# Patient Record
Sex: FEMALE | Race: WHITE | Employment: OTHER | URBAN - METROPOLITAN AREA
[De-identification: names, ages, dates, MRNs, and addresses within clinical notes are randomized per-mention and may not be internally consistent; named-entity substitution may affect disease eponyms.]

---

## 2019-02-07 DIAGNOSIS — M25.551 RIGHT HIP PAIN: Primary | ICD-10-CM

## 2019-02-19 ENCOUNTER — HOSPITAL ENCOUNTER (OUTPATIENT)
Dept: INTERVENTIONAL RADIOLOGY/VASCULAR | Age: 49
Discharge: HOME | End: 2019-02-19
Payer: COMMERCIAL

## 2019-02-19 DIAGNOSIS — M25.551 RIGHT HIP PAIN: ICD-10-CM

## 2019-02-19 PROCEDURE — 76000 FLUOROSCOPY <1 HR PHYS/QHP: CPT

## 2019-03-26 ENCOUNTER — TELEPHONE (OUTPATIENT)
Dept: ORTHOPEDIC SURGERY | Age: 49
End: 2019-03-26

## 2019-03-26 NOTE — TELEPHONE ENCOUNTER
Yana RECORDS 2008 TO PRESENT [facility] ) AND A NO RECORDS FOR DATE REQUESTED STATEMENT FOR 2008 TO THE PRESENT FOR BILLING TO PEG RAPP TO SCAN IN MRO TO TEAM LEGAL

## 2024-10-18 RX ORDER — TORSEMIDE 20 MG/1
20 TABLET ORAL DAILY
COMMUNITY
Start: 2024-08-21

## 2024-10-18 RX ORDER — SENNA AND DOCUSATE SODIUM 50; 8.6 MG/1; MG/1
1 TABLET, FILM COATED ORAL DAILY
COMMUNITY

## 2024-10-18 RX ORDER — POTASSIUM CHLORIDE 750 MG/1
10 CAPSULE, EXTENDED RELEASE ORAL DAILY
COMMUNITY

## 2024-10-22 ENCOUNTER — ANESTHESIA EVENT (OUTPATIENT)
Dept: OPERATING ROOM | Age: 54
End: 2024-10-22
Payer: MEDICARE

## 2024-10-23 ENCOUNTER — APPOINTMENT (OUTPATIENT)
Dept: CT IMAGING | Age: 54
DRG: 427 | End: 2024-10-23
Attending: NEUROLOGICAL SURGERY
Payer: MEDICARE

## 2024-10-23 ENCOUNTER — ANESTHESIA (OUTPATIENT)
Dept: OPERATING ROOM | Age: 54
End: 2024-10-23
Payer: MEDICARE

## 2024-10-23 ENCOUNTER — HOSPITAL ENCOUNTER (INPATIENT)
Age: 54
LOS: 4 days | Discharge: HOME | DRG: 427 | End: 2024-10-27
Attending: NEUROLOGICAL SURGERY | Admitting: NEUROLOGICAL SURGERY
Payer: MEDICARE

## 2024-10-23 ENCOUNTER — APPOINTMENT (OUTPATIENT)
Dept: GENERAL RADIOLOGY | Age: 54
DRG: 427 | End: 2024-10-23
Attending: NEUROLOGICAL SURGERY
Payer: MEDICARE

## 2024-10-23 DIAGNOSIS — Z98.1 S/P LUMBAR FUSION: Primary | ICD-10-CM

## 2024-10-23 PROBLEM — M54.16 LUMBAR RADICULOPATHY: Status: ACTIVE | Noted: 2024-10-23

## 2024-10-23 LAB
ABO + RH BLD: NORMAL
APTT BLD: 29.5 SEC (ref 22.1–36.4)
APTT BLD: 31.5 SEC (ref 22.1–36.4)
BASOPHILS # BLD: 0 K/UL (ref 0–0.2)
BASOPHILS NFR BLD: 0.3 %
BLD GP AB SCN SERPL QL: NORMAL
DEPRECATED RDW RBC AUTO: 16.7 % (ref 12.4–15.4)
EOSINOPHIL # BLD: 0 K/UL (ref 0–0.6)
EOSINOPHIL NFR BLD: 0 %
HCT VFR BLD AUTO: 31.7 % (ref 36–48)
HGB BLD-MCNC: 10.7 G/DL (ref 12–16)
INR PPP: 0.94 (ref 0.85–1.15)
INR PPP: 1.07 (ref 0.85–1.15)
LYMPHOCYTES # BLD: 0.6 K/UL (ref 1–5.1)
LYMPHOCYTES NFR BLD: 8.2 %
MCH RBC QN AUTO: 26.9 PG (ref 26–34)
MCHC RBC AUTO-ENTMCNC: 33.6 G/DL (ref 31–36)
MCV RBC AUTO: 80 FL (ref 80–100)
MONOCYTES # BLD: 0.2 K/UL (ref 0–1.3)
MONOCYTES NFR BLD: 3 %
NEUTROPHILS # BLD: 6.9 K/UL (ref 1.7–7.7)
NEUTROPHILS NFR BLD: 88.5 %
PLATELET # BLD AUTO: 158 K/UL (ref 135–450)
PMV BLD AUTO: 8.9 FL (ref 5–10.5)
PROTHROMBIN TIME: 12.8 SEC (ref 11.9–14.9)
PROTHROMBIN TIME: 14.1 SEC (ref 11.9–14.9)
RBC # BLD AUTO: 3.96 M/UL (ref 4–5.2)
WBC # BLD AUTO: 7.8 K/UL (ref 4–11)

## 2024-10-23 PROCEDURE — 87641 MR-STAPH DNA AMP PROBE: CPT

## 2024-10-23 PROCEDURE — 2709999900 HC NON-CHARGEABLE SUPPLY: Performed by: NEUROLOGICAL SURGERY

## 2024-10-23 PROCEDURE — 85025 COMPLETE CBC W/AUTO DIFF WBC: CPT

## 2024-10-23 PROCEDURE — 72131 CT LUMBAR SPINE W/O DYE: CPT

## 2024-10-23 PROCEDURE — 2720000010 HC SURG SUPPLY STERILE: Performed by: NEUROLOGICAL SURGERY

## 2024-10-23 PROCEDURE — 6360000002 HC RX W HCPCS

## 2024-10-23 PROCEDURE — 2580000003 HC RX 258: Performed by: PHYSICIAN ASSISTANT

## 2024-10-23 PROCEDURE — C1713 ANCHOR/SCREW BN/BN,TIS/BN: HCPCS | Performed by: NEUROLOGICAL SURGERY

## 2024-10-23 PROCEDURE — 2500000003 HC RX 250 WO HCPCS

## 2024-10-23 PROCEDURE — 86850 RBC ANTIBODY SCREEN: CPT

## 2024-10-23 PROCEDURE — 6370000000 HC RX 637 (ALT 250 FOR IP)

## 2024-10-23 PROCEDURE — 3700000000 HC ANESTHESIA ATTENDED CARE: Performed by: NEUROLOGICAL SURGERY

## 2024-10-23 PROCEDURE — 3600000004 HC SURGERY LEVEL 4 BASE: Performed by: NEUROLOGICAL SURGERY

## 2024-10-23 PROCEDURE — 6360000002 HC RX W HCPCS: Performed by: NEUROLOGICAL SURGERY

## 2024-10-23 PROCEDURE — 0SG1071 FUSION OF 2 OR MORE LUMBAR VERTEBRAL JOINTS WITH AUTOLOGOUS TISSUE SUBSTITUTE, POSTERIOR APPROACH, POSTERIOR COLUMN, OPEN APPROACH: ICD-10-PCS | Performed by: NEUROLOGICAL SURGERY

## 2024-10-23 PROCEDURE — 86901 BLOOD TYPING SEROLOGIC RH(D): CPT

## 2024-10-23 PROCEDURE — 6360000002 HC RX W HCPCS: Performed by: NURSE ANESTHETIST, CERTIFIED REGISTERED

## 2024-10-23 PROCEDURE — 2580000003 HC RX 258: Performed by: NEUROLOGICAL SURGERY

## 2024-10-23 PROCEDURE — 3600000014 HC SURGERY LEVEL 4 ADDTL 15MIN: Performed by: NEUROLOGICAL SURGERY

## 2024-10-23 PROCEDURE — 0SP004Z REMOVAL OF INTERNAL FIXATION DEVICE FROM LUMBAR VERTEBRAL JOINT, OPEN APPROACH: ICD-10-PCS | Performed by: NEUROLOGICAL SURGERY

## 2024-10-23 PROCEDURE — 2060000000 HC ICU INTERMEDIATE R&B

## 2024-10-23 PROCEDURE — 0SG00AJ FUSION OF LUMBAR VERTEBRAL JOINT WITH INTERBODY FUSION DEVICE, POSTERIOR APPROACH, ANTERIOR COLUMN, OPEN APPROACH: ICD-10-PCS | Performed by: NEUROLOGICAL SURGERY

## 2024-10-23 PROCEDURE — 3700000001 HC ADD 15 MINUTES (ANESTHESIA): Performed by: NEUROLOGICAL SURGERY

## 2024-10-23 PROCEDURE — 6370000000 HC RX 637 (ALT 250 FOR IP): Performed by: PHYSICIAN ASSISTANT

## 2024-10-23 PROCEDURE — 6360000002 HC RX W HCPCS: Performed by: PHYSICIAN ASSISTANT

## 2024-10-23 PROCEDURE — 72100 X-RAY EXAM L-S SPINE 2/3 VWS: CPT

## 2024-10-23 PROCEDURE — C9290 INJ, BUPIVACAINE LIPOSOME: HCPCS | Performed by: NEUROLOGICAL SURGERY

## 2024-10-23 PROCEDURE — 86900 BLOOD TYPING SEROLOGIC ABO: CPT

## 2024-10-23 PROCEDURE — 01NB0ZZ RELEASE LUMBAR NERVE, OPEN APPROACH: ICD-10-PCS | Performed by: NEUROLOGICAL SURGERY

## 2024-10-23 PROCEDURE — 0ST20ZZ RESECTION OF LUMBAR VERTEBRAL DISC, OPEN APPROACH: ICD-10-PCS | Performed by: NEUROLOGICAL SURGERY

## 2024-10-23 PROCEDURE — 7100000001 HC PACU RECOVERY - ADDTL 15 MIN: Performed by: NEUROLOGICAL SURGERY

## 2024-10-23 PROCEDURE — 85730 THROMBOPLASTIN TIME PARTIAL: CPT

## 2024-10-23 PROCEDURE — 85610 PROTHROMBIN TIME: CPT

## 2024-10-23 PROCEDURE — 2780000010 HC IMPLANT OTHER: Performed by: NEUROLOGICAL SURGERY

## 2024-10-23 PROCEDURE — 7100000000 HC PACU RECOVERY - FIRST 15 MIN: Performed by: NEUROLOGICAL SURGERY

## 2024-10-23 PROCEDURE — 6360000002 HC RX W HCPCS: Performed by: ANESTHESIOLOGY

## 2024-10-23 PROCEDURE — 2580000003 HC RX 258

## 2024-10-23 PROCEDURE — 2580000003 HC RX 258: Performed by: ANESTHESIOLOGY

## 2024-10-23 PROCEDURE — 2500000003 HC RX 250 WO HCPCS: Performed by: NEUROLOGICAL SURGERY

## 2024-10-23 DEVICE — DBM 7509211 MAGNIFUSE 1 X 10CM
Type: IMPLANTABLE DEVICE | Site: BACK | Status: FUNCTIONAL
Brand: MAGNIFUSE® BONE GRAFT

## 2024-10-23 DEVICE — SET SCREW SPNL 5.5-6 MM T27 NS TRIALTIS: Type: IMPLANTABLE DEVICE | Site: SPINE LUMBAR | Status: FUNCTIONAL

## 2024-10-23 DEVICE — GRAFT HUM TISS 10ML FRMBL CELLULAR BNE MTRX CRYOPRESERVED: Type: IMPLANTABLE DEVICE | Site: BACK | Status: FUNCTIONAL

## 2024-10-23 DEVICE — IMPLANTABLE DEVICE: Type: IMPLANTABLE DEVICE | Site: SPINE LUMBAR | Status: FUNCTIONAL

## 2024-10-23 DEVICE — ROD SPNL L70MM DIA55MM TI PRELORDOSED EXPEDIUM: Type: IMPLANTABLE DEVICE | Site: SPINE LUMBAR | Status: FUNCTIONAL

## 2024-10-23 RX ORDER — ENOXAPARIN SODIUM 100 MG/ML
40 INJECTION SUBCUTANEOUS DAILY
Status: DISCONTINUED | OUTPATIENT
Start: 2024-10-24 | End: 2024-10-27 | Stop reason: HOSPADM

## 2024-10-23 RX ORDER — HYDROCODONE BITARTRATE AND ACETAMINOPHEN 7.5; 325 MG/1; MG/1
1 TABLET ORAL EVERY 6 HOURS PRN
Status: DISCONTINUED | OUTPATIENT
Start: 2024-10-23 | End: 2024-10-23

## 2024-10-23 RX ORDER — METHOCARBAMOL 750 MG/1
750 TABLET, FILM COATED ORAL EVERY 8 HOURS
Status: DISPENSED | OUTPATIENT
Start: 2024-10-23 | End: 2024-10-24

## 2024-10-23 RX ORDER — SODIUM CHLORIDE 0.9 % (FLUSH) 0.9 %
5-40 SYRINGE (ML) INJECTION EVERY 12 HOURS SCHEDULED
Status: DISCONTINUED | OUTPATIENT
Start: 2024-10-23 | End: 2024-10-23 | Stop reason: HOSPADM

## 2024-10-23 RX ORDER — 0.9 % SODIUM CHLORIDE 0.9 %
INTRAVENOUS SOLUTION INTRAVENOUS CONTINUOUS
Status: DISCONTINUED | OUTPATIENT
Start: 2024-10-23 | End: 2024-10-23

## 2024-10-23 RX ORDER — LIDOCAINE HYDROCHLORIDE 20 MG/ML
INJECTION, SOLUTION INTRAVENOUS
Status: DISCONTINUED | OUTPATIENT
Start: 2024-10-23 | End: 2024-10-23 | Stop reason: SDUPTHER

## 2024-10-23 RX ORDER — BISACODYL 10 MG
10 SUPPOSITORY, RECTAL RECTAL DAILY PRN
Status: DISCONTINUED | OUTPATIENT
Start: 2024-10-23 | End: 2024-10-27 | Stop reason: HOSPADM

## 2024-10-23 RX ORDER — SODIUM CHLORIDE 9 MG/ML
INJECTION, SOLUTION INTRAVENOUS CONTINUOUS
Status: DISCONTINUED | OUTPATIENT
Start: 2024-10-23 | End: 2024-10-24

## 2024-10-23 RX ORDER — FENTANYL CITRATE 50 UG/ML
25 INJECTION, SOLUTION INTRAMUSCULAR; INTRAVENOUS EVERY 5 MIN PRN
Status: DISCONTINUED | OUTPATIENT
Start: 2024-10-23 | End: 2024-10-23 | Stop reason: HOSPADM

## 2024-10-23 RX ORDER — SODIUM CHLORIDE 9 MG/ML
INJECTION, SOLUTION INTRAVENOUS PRN
Status: DISCONTINUED | OUTPATIENT
Start: 2024-10-23 | End: 2024-10-27 | Stop reason: HOSPADM

## 2024-10-23 RX ORDER — SODIUM CHLORIDE, SODIUM LACTATE, POTASSIUM CHLORIDE, CALCIUM CHLORIDE 600; 310; 30; 20 MG/100ML; MG/100ML; MG/100ML; MG/100ML
INJECTION, SOLUTION INTRAVENOUS
Status: DISCONTINUED | OUTPATIENT
Start: 2024-10-23 | End: 2024-10-23 | Stop reason: SDUPTHER

## 2024-10-23 RX ORDER — FENTANYL CITRATE 50 UG/ML
INJECTION, SOLUTION INTRAMUSCULAR; INTRAVENOUS
Status: DISCONTINUED | OUTPATIENT
Start: 2024-10-23 | End: 2024-10-23 | Stop reason: SDUPTHER

## 2024-10-23 RX ORDER — HYDROMORPHONE HYDROCHLORIDE 2 MG/ML
INJECTION, SOLUTION INTRAMUSCULAR; INTRAVENOUS; SUBCUTANEOUS
Status: DISCONTINUED | OUTPATIENT
Start: 2024-10-23 | End: 2024-10-23 | Stop reason: SDUPTHER

## 2024-10-23 RX ORDER — PROCHLORPERAZINE EDISYLATE 5 MG/ML
5 INJECTION INTRAMUSCULAR; INTRAVENOUS
Status: DISCONTINUED | OUTPATIENT
Start: 2024-10-23 | End: 2024-10-23 | Stop reason: HOSPADM

## 2024-10-23 RX ORDER — LIDOCAINE HYDROCHLORIDE AND EPINEPHRINE 10; 10 MG/ML; UG/ML
INJECTION, SOLUTION INFILTRATION; PERINEURAL PRN
Status: DISCONTINUED | OUTPATIENT
Start: 2024-10-23 | End: 2024-10-23 | Stop reason: HOSPADM

## 2024-10-23 RX ORDER — ACETAMINOPHEN 325 MG/1
650 TABLET ORAL EVERY 6 HOURS
Status: DISCONTINUED | OUTPATIENT
Start: 2024-10-23 | End: 2024-10-27 | Stop reason: HOSPADM

## 2024-10-23 RX ORDER — SODIUM CHLORIDE 0.9 % (FLUSH) 0.9 %
5-40 SYRINGE (ML) INJECTION PRN
Status: DISCONTINUED | OUTPATIENT
Start: 2024-10-23 | End: 2024-10-27 | Stop reason: HOSPADM

## 2024-10-23 RX ORDER — HALOPERIDOL 5 MG/ML
1 INJECTION INTRAMUSCULAR
Status: DISCONTINUED | OUTPATIENT
Start: 2024-10-23 | End: 2024-10-23 | Stop reason: HOSPADM

## 2024-10-23 RX ORDER — DEXAMETHASONE SODIUM PHOSPHATE 4 MG/ML
INJECTION, SOLUTION INTRA-ARTICULAR; INTRALESIONAL; INTRAMUSCULAR; INTRAVENOUS; SOFT TISSUE
Status: DISCONTINUED | OUTPATIENT
Start: 2024-10-23 | End: 2024-10-23 | Stop reason: SDUPTHER

## 2024-10-23 RX ORDER — POLYETHYLENE GLYCOL 3350 17 G/17G
17 POWDER, FOR SOLUTION ORAL DAILY
Status: DISCONTINUED | OUTPATIENT
Start: 2024-10-24 | End: 2024-10-27 | Stop reason: HOSPADM

## 2024-10-23 RX ORDER — TORSEMIDE 20 MG/1
20 TABLET ORAL DAILY
Status: DISCONTINUED | OUTPATIENT
Start: 2024-10-24 | End: 2024-10-27 | Stop reason: HOSPADM

## 2024-10-23 RX ORDER — METHOCARBAMOL 100 MG/ML
1000 INJECTION, SOLUTION INTRAMUSCULAR; INTRAVENOUS EVERY 8 HOURS
Status: ACTIVE | OUTPATIENT
Start: 2024-10-23 | End: 2024-10-24

## 2024-10-23 RX ORDER — SODIUM CHLORIDE 9 MG/ML
INJECTION, SOLUTION INTRAVENOUS CONTINUOUS
Status: DISCONTINUED | OUTPATIENT
Start: 2024-10-23 | End: 2024-10-23 | Stop reason: HOSPADM

## 2024-10-23 RX ORDER — SUCCINYLCHOLINE/SOD CL,ISO/PF 200MG/10ML
SYRINGE (ML) INTRAVENOUS
Status: DISCONTINUED | OUTPATIENT
Start: 2024-10-23 | End: 2024-10-23 | Stop reason: SDUPTHER

## 2024-10-23 RX ORDER — SODIUM CHLORIDE 0.9 % (FLUSH) 0.9 %
5-40 SYRINGE (ML) INJECTION PRN
Status: DISCONTINUED | OUTPATIENT
Start: 2024-10-23 | End: 2024-10-23 | Stop reason: HOSPADM

## 2024-10-23 RX ORDER — NALOXONE HYDROCHLORIDE 0.4 MG/ML
INJECTION, SOLUTION INTRAMUSCULAR; INTRAVENOUS; SUBCUTANEOUS PRN
Status: DISCONTINUED | OUTPATIENT
Start: 2024-10-23 | End: 2024-10-23 | Stop reason: HOSPADM

## 2024-10-23 RX ORDER — OXYCODONE HYDROCHLORIDE 5 MG/1
5 TABLET ORAL EVERY 4 HOURS PRN
Status: DISCONTINUED | OUTPATIENT
Start: 2024-10-23 | End: 2024-10-27 | Stop reason: HOSPADM

## 2024-10-23 RX ORDER — FAMOTIDINE 20 MG/1
20 TABLET, FILM COATED ORAL 2 TIMES DAILY
Status: DISCONTINUED | OUTPATIENT
Start: 2024-10-23 | End: 2024-10-27 | Stop reason: HOSPADM

## 2024-10-23 RX ORDER — HYDROMORPHONE HYDROCHLORIDE 1 MG/ML
0.5 INJECTION, SOLUTION INTRAMUSCULAR; INTRAVENOUS; SUBCUTANEOUS EVERY 5 MIN PRN
Status: COMPLETED | OUTPATIENT
Start: 2024-10-23 | End: 2024-10-23

## 2024-10-23 RX ORDER — DIPHENHYDRAMINE HYDROCHLORIDE 50 MG/ML
25 INJECTION INTRAMUSCULAR; INTRAVENOUS EVERY 6 HOURS PRN
Status: DISCONTINUED | OUTPATIENT
Start: 2024-10-23 | End: 2024-10-27 | Stop reason: HOSPADM

## 2024-10-23 RX ORDER — PROPOFOL 10 MG/ML
INJECTION, EMULSION INTRAVENOUS
Status: DISCONTINUED | OUTPATIENT
Start: 2024-10-23 | End: 2024-10-23 | Stop reason: SDUPTHER

## 2024-10-23 RX ORDER — GLYCOPYRROLATE 0.2 MG/ML
INJECTION INTRAMUSCULAR; INTRAVENOUS
Status: DISCONTINUED | OUTPATIENT
Start: 2024-10-23 | End: 2024-10-23 | Stop reason: SDUPTHER

## 2024-10-23 RX ORDER — ONDANSETRON 2 MG/ML
4 INJECTION INTRAMUSCULAR; INTRAVENOUS EVERY 6 HOURS PRN
Status: DISCONTINUED | OUTPATIENT
Start: 2024-10-23 | End: 2024-10-27 | Stop reason: HOSPADM

## 2024-10-23 RX ORDER — HYDRALAZINE HYDROCHLORIDE 20 MG/ML
10 INJECTION INTRAMUSCULAR; INTRAVENOUS
Status: DISCONTINUED | OUTPATIENT
Start: 2024-10-23 | End: 2024-10-23 | Stop reason: HOSPADM

## 2024-10-23 RX ORDER — METHOCARBAMOL 100 MG/ML
INJECTION, SOLUTION INTRAMUSCULAR; INTRAVENOUS
Status: DISCONTINUED | OUTPATIENT
Start: 2024-10-23 | End: 2024-10-23 | Stop reason: SDUPTHER

## 2024-10-23 RX ORDER — ROCURONIUM BROMIDE 10 MG/ML
INJECTION, SOLUTION INTRAVENOUS
Status: DISCONTINUED | OUTPATIENT
Start: 2024-10-23 | End: 2024-10-23 | Stop reason: SDUPTHER

## 2024-10-23 RX ORDER — BISACODYL 5 MG/1
5 TABLET, DELAYED RELEASE ORAL DAILY PRN
Status: DISCONTINUED | OUTPATIENT
Start: 2024-10-23 | End: 2024-10-27 | Stop reason: HOSPADM

## 2024-10-23 RX ORDER — ONDANSETRON 2 MG/ML
INJECTION INTRAMUSCULAR; INTRAVENOUS
Status: DISCONTINUED | OUTPATIENT
Start: 2024-10-23 | End: 2024-10-23 | Stop reason: SDUPTHER

## 2024-10-23 RX ORDER — PROMETHAZINE HYDROCHLORIDE 12.5 MG/1
12.5 TABLET ORAL EVERY 6 HOURS PRN
Status: DISCONTINUED | OUTPATIENT
Start: 2024-10-23 | End: 2024-10-27 | Stop reason: HOSPADM

## 2024-10-23 RX ORDER — SODIUM CHLORIDE 9 MG/ML
INJECTION, SOLUTION INTRAVENOUS PRN
Status: DISCONTINUED | OUTPATIENT
Start: 2024-10-23 | End: 2024-10-23 | Stop reason: HOSPADM

## 2024-10-23 RX ORDER — HYDROMORPHONE HYDROCHLORIDE 1 MG/ML
0.5 INJECTION, SOLUTION INTRAMUSCULAR; INTRAVENOUS; SUBCUTANEOUS EVERY 4 HOURS PRN
Status: DISPENSED | OUTPATIENT
Start: 2024-10-23 | End: 2024-10-26

## 2024-10-23 RX ORDER — OXYCODONE HYDROCHLORIDE 5 MG/1
10 TABLET ORAL EVERY 4 HOURS PRN
Status: DISCONTINUED | OUTPATIENT
Start: 2024-10-23 | End: 2024-10-27 | Stop reason: HOSPADM

## 2024-10-23 RX ORDER — SODIUM CHLORIDE 0.9 % (FLUSH) 0.9 %
5-40 SYRINGE (ML) INJECTION EVERY 12 HOURS SCHEDULED
Status: DISCONTINUED | OUTPATIENT
Start: 2024-10-23 | End: 2024-10-27 | Stop reason: HOSPADM

## 2024-10-23 RX ORDER — METHOCARBAMOL 750 MG/1
750 TABLET, FILM COATED ORAL EVERY 8 HOURS
Status: DISCONTINUED | OUTPATIENT
Start: 2024-10-23 | End: 2024-10-23 | Stop reason: SDUPTHER

## 2024-10-23 RX ORDER — DIPHENHYDRAMINE HCL 25 MG
25 TABLET ORAL EVERY 6 HOURS PRN
Status: DISCONTINUED | OUTPATIENT
Start: 2024-10-23 | End: 2024-10-27 | Stop reason: HOSPADM

## 2024-10-23 RX ADMIN — ONDANSETRON 4 MG: 2 INJECTION INTRAMUSCULAR; INTRAVENOUS at 14:44

## 2024-10-23 RX ADMIN — FENTANYL CITRATE 50 MCG: 50 INJECTION, SOLUTION INTRAMUSCULAR; INTRAVENOUS at 17:35

## 2024-10-23 RX ADMIN — OXYCODONE 10 MG: 5 TABLET ORAL at 21:56

## 2024-10-23 RX ADMIN — PROPOFOL 150 MG: 10 INJECTION, EMULSION INTRAVENOUS at 14:37

## 2024-10-23 RX ADMIN — HYDROMORPHONE HYDROCHLORIDE 0.5 MG: 1 INJECTION, SOLUTION INTRAMUSCULAR; INTRAVENOUS; SUBCUTANEOUS at 18:25

## 2024-10-23 RX ADMIN — METHOCARBAMOL TABLETS 750 MG: 750 TABLET, COATED ORAL at 21:56

## 2024-10-23 RX ADMIN — HYDROMORPHONE HYDROCHLORIDE 0.5 MG: 1 INJECTION, SOLUTION INTRAMUSCULAR; INTRAVENOUS; SUBCUTANEOUS at 18:33

## 2024-10-23 RX ADMIN — CEFAZOLIN 2000 MG: 2 INJECTION, POWDER, FOR SOLUTION INTRAVENOUS at 22:39

## 2024-10-23 RX ADMIN — PROPOFOL 150 MCG/KG/MIN: 10 INJECTION, EMULSION INTRAVENOUS at 14:36

## 2024-10-23 RX ADMIN — SODIUM CHLORIDE, SODIUM LACTATE, POTASSIUM CHLORIDE, AND CALCIUM CHLORIDE: .6; .31; .03; .02 INJECTION, SOLUTION INTRAVENOUS at 14:43

## 2024-10-23 RX ADMIN — SODIUM CHLORIDE 900 ML: 9 INJECTION, SOLUTION INTRAVENOUS at 19:31

## 2024-10-23 RX ADMIN — ACETAMINOPHEN 650 MG: 325 TABLET ORAL at 21:56

## 2024-10-23 RX ADMIN — METHOCARBAMOL 250 MG: 100 INJECTION INTRAMUSCULAR; INTRAVENOUS at 15:18

## 2024-10-23 RX ADMIN — GLYCOPYRROLATE 0.2 MG: 0.2 INJECTION INTRAMUSCULAR; INTRAVENOUS at 16:15

## 2024-10-23 RX ADMIN — REMIFENTANIL HYDROCHLORIDE 0.15 MCG/KG/MIN: 1 INJECTION, POWDER, LYOPHILIZED, FOR SOLUTION INTRAVENOUS at 14:37

## 2024-10-23 RX ADMIN — WATER 2000 MG: 1 INJECTION INTRAMUSCULAR; INTRAVENOUS; SUBCUTANEOUS at 14:50

## 2024-10-23 RX ADMIN — METHOCARBAMOL 250 MG: 100 INJECTION INTRAMUSCULAR; INTRAVENOUS at 15:37

## 2024-10-23 RX ADMIN — ROCURONIUM BROMIDE 50 MG: 10 INJECTION, SOLUTION INTRAVENOUS at 15:00

## 2024-10-23 RX ADMIN — FAMOTIDINE 20 MG: 20 TABLET, FILM COATED ORAL at 21:56

## 2024-10-23 RX ADMIN — HYDROMORPHONE HYDROCHLORIDE 2 MG: 2 INJECTION, SOLUTION INTRAMUSCULAR; INTRAVENOUS; SUBCUTANEOUS at 17:28

## 2024-10-23 RX ADMIN — Medication 140 MG: at 14:38

## 2024-10-23 RX ADMIN — FENTANYL CITRATE 50 MCG: 50 INJECTION, SOLUTION INTRAMUSCULAR; INTRAVENOUS at 14:37

## 2024-10-23 RX ADMIN — LIDOCAINE HYDROCHLORIDE 80 MG: 20 INJECTION, SOLUTION INTRAVENOUS at 14:37

## 2024-10-23 RX ADMIN — METHOCARBAMOL 250 MG: 100 INJECTION INTRAMUSCULAR; INTRAVENOUS at 15:48

## 2024-10-23 RX ADMIN — METHOCARBAMOL 250 MG: 100 INJECTION INTRAMUSCULAR; INTRAVENOUS at 15:23

## 2024-10-23 RX ADMIN — DEXAMETHASONE SODIUM PHOSPHATE 4 MG: 4 INJECTION INTRA-ARTICULAR; INTRALESIONAL; INTRAMUSCULAR; INTRAVENOUS; SOFT TISSUE at 14:44

## 2024-10-23 RX ADMIN — PHENYLEPHRINE HYDROCHLORIDE 20 MCG/MIN: 10 INJECTION INTRAVENOUS at 14:43

## 2024-10-23 RX ADMIN — SODIUM CHLORIDE, PRESERVATIVE FREE 10 ML: 5 INJECTION INTRAVENOUS at 21:56

## 2024-10-23 RX ADMIN — SODIUM CHLORIDE: 9 INJECTION, SOLUTION INTRAVENOUS at 12:35

## 2024-10-23 ASSESSMENT — PAIN SCALES - GENERAL
PAINLEVEL_OUTOF10: 0
PAINLEVEL_OUTOF10: 8
PAINLEVEL_OUTOF10: 6
PAINLEVEL_OUTOF10: 5

## 2024-10-23 ASSESSMENT — PAIN DESCRIPTION - ONSET: ONSET: PROGRESSIVE

## 2024-10-23 ASSESSMENT — PAIN DESCRIPTION - ORIENTATION
ORIENTATION: MID
ORIENTATION: MID;LOWER

## 2024-10-23 ASSESSMENT — PAIN - FUNCTIONAL ASSESSMENT
PAIN_FUNCTIONAL_ASSESSMENT: PREVENTS OR INTERFERES SOME ACTIVE ACTIVITIES AND ADLS
PAIN_FUNCTIONAL_ASSESSMENT: 0-10
PAIN_FUNCTIONAL_ASSESSMENT: PREVENTS OR INTERFERES SOME ACTIVE ACTIVITIES AND ADLS

## 2024-10-23 ASSESSMENT — PAIN DESCRIPTION - LOCATION
LOCATION: BACK
LOCATION: BACK

## 2024-10-23 ASSESSMENT — PAIN DESCRIPTION - DESCRIPTORS
DESCRIPTORS: BURNING;ACHING
DESCRIPTORS: ACHING
DESCRIPTORS: ACHING;SORE

## 2024-10-23 ASSESSMENT — PAIN DESCRIPTION - FREQUENCY: FREQUENCY: CONTINUOUS

## 2024-10-23 ASSESSMENT — PAIN DESCRIPTION - PAIN TYPE: TYPE: SURGICAL PAIN

## 2024-10-23 NOTE — OP NOTE
Operative Note      Patient: Ivis Ibarra  YOB: 1970  MRN: [id number]    Date of Procedure: 10/23/2024       SURGEON:  Tramaine Wren MD  ASSISTANT:  Mandeep Melendrez PA-C        PREOPERATIVE DIAGNOSIS:  1. Lumbar spondylolisthesis .  2. Severe L2-3 and L3-4 stenosis  3. Lumbar Foraminal stenosis  4. Lumbar Radiculopathy  5. Neurogenic Claudication  6. Hardware Failure     POSTOPERATIVE DIAGNOSIS:  1.  Same.     PROCEDURES PERFORMED:  Lumbar posterior midline Approach - Re-exploration  2.   L2-3 and L3-4 bilateral decompressive laminectomy, medial facetectomy,foraminotomy   3.   Total bilateral pars removal and L2-3 facetectomy  4.   Microdissection using the operating room microscope.  5.   L2-3 TLIF with Placement of Peek interbody cage packed with surgery site autograft and allograft bone into the L2-3 interspaces.  6.   Transforaminal lumbar interbody fusion at L2-3  7.   Placement of Depuy pedicle screws at L2-3-4 for bilaterally fixation using O-arm stereotactic intraoperative real time navigation.  8.   Posterolateral L2-3 and L3-4  arthrodesis surgery site autograft and allograft.  9.  O-arm stereotactic intraoperative real time navigation.  10. Flouroscopy  11. Removal of prior L3-4 pedicle screws  12. EVOKES     ANESTHESIA: General    EBL: 250 ml     INDICATIONS FOR SURGERY: The patient is a 53  y.o. with back and severe bilateral leg pain. Their symptoms failed to respond to conservative intervention. She developed symptoms of cauda equina.  She has had a prior L3-4 fusion with a titanium graft that had retropulsed back into the canal.   An MRI scan was performed and this showed evidence of lumbar severe stenosis at L2-3 and L3-4. Having failed conservative management and experiencing persistent symptoms, the patient elected to proceed ahead with the surgical option of L2-3 and L3-4 decompression, fusion and fixation.     DETAILS OF PROCEDURE: The patient was brought to the operating room and

## 2024-10-23 NOTE — BRIEF OP NOTE
Brief Postoperative Note      Patient: Ivis Ibarra  YOB: 1970  MRN: [id number]    Date of Procedure: 10/23/2024    Pre-Op Diagnosis Codes:      * Mechanical breakdown of internal fixation device of bone of extremity, initial encounter ([facility]) [T84.119A]     * Lumbar stenosis with neurogenic claudication [M48.062]    Post-Op Diagnosis: Same       Procedure(s):  LUMBAR 2-LUMBAR 3 AND LUMBAR 3-LUMBAR 4 DECOMPRESSION FUSION FIXATION-TRANSFORAMINAL LUMBAR INTERBODY FUSION    Surgeon(s):  Tramaine Wren MD    Assistant:  Surgical Assistant: Donte Knight  Physician Assistant: Mandeep Melendrez PA-C    Anesthesia: General    Estimated Blood Loss (mL): 300     Complications: None    Specimens:   * No specimens in log *    Implants:  Implant Name Type Inv. Item Serial No.  Lot No. LRB No. Used Action   GRAFT BNE SUB [id number] SPNL DEFORMITY MAGNIFUSE SC - [id number]  GRAFT BNE SUB [id number] SPNL DEFORMITY MAGNIFUSE SC [id number] [employer] TECH-WD  N/A 1 Implanted   GRAFT HUM TISS 10ML FRMBL CELLULAR BNE MTRX CRYOPRESERVED - [id number]  GRAFT HUM TISS 10ML FRMBL CELLULAR BNE MTRX CRYOPRESERVED [id number] [facility] TISSUE B[facility]-  N/A 1 Implanted         Drains:   Urinary Catheter 10/23/24 2 Way;[place]connell-Temperature (Active)       Findings:  Infection Present At Time Of Surgery (PATOS) (choose all levels that have infection present):  No infection present  Other Findings: Stenosis    Electronically signed by Tramaine Wren MD on 10/23/2024 at 5:09 PM

## 2024-10-23 NOTE — ANESTHESIA PRE PROCEDURE
vomiting     PONV (postoperative nausea and vomiting)        Past Surgical History:        Procedure Laterality Date    ABDOMEN SURGERY      BENIGN TUMOR    BACK SURGERY      x4 - lumbar spine    GASTRIC BYPASS SURGERY      HERNIA REPAIR      x3    HIP SURGERY      pt has had 20 previous surgeries on right hip    JOINT REPLACEMENT      right hip    NECK SURGERY      x3    OVARY REMOVAL      RIGHT    REVISION TOTAL HIP ARTHROPLASTY  09/22/2008    REVISION TOTAL HIP ARTHROPLASTY  03/14/2011    revision right total hip arthroplasty    SHOULDER ARTHROSCOPY Left     X2       Social History:    Social History     Tobacco Use    Smoking status: Former     Types: Cigarettes    Smokeless tobacco: Never   Substance Use Topics    Alcohol use: No                                Counseling given: Not Answered      Vital Signs (Current):   Vitals:    10/18/24 1509 10/23/24 1148   BP:  139/77   Pulse:  72   Resp:  16   Temp:  98.2 °F (36.8 °C)   TempSrc:  Temporal   SpO2:  100%   Weight: 74.8 kg (165 lb) 76.6 kg (168 lb 12.8 oz)   Height: 1.549 m (5' 1\") 1.549 m (5' 1\")                                              BP Readings from Last 3 Encounters:   10/23/24 139/77       NPO Status: Time of last liquid consumption: 0400                        Time of last solid consumption: 2000                        Date of last liquid consumption: 10/22/24                        Date of last solid food consumption: 10/22/24    BMI:   Wt Readings from Last 3 Encounters:   10/23/24 76.6 kg (168 lb 12.8 oz)   02/28/11 83.9 kg (185 lb)     Body mass index is 31.89 kg/m².    CBC:   Lab Results   Component Value Date/Time    WBC 9.2 03/16/2011 08:15 AM    RBC 4.26 03/16/2011 08:15 AM    HGB 12.1 03/16/2011 08:15 AM    HCT 36.6 03/16/2011 08:15 AM    MCV 85.8 03/16/2011 08:15 AM    RDW 14.3 03/16/2011 08:15 AM     03/16/2011 08:15 AM       CMP:   Lab Results   Component Value Date/Time     03/15/2011 05:00 AM    K 3.5 03/15/2011 05:00 AM

## 2024-10-23 NOTE — ANESTHESIA POSTPROCEDURE EVALUATION
Department of Anesthesiology  Postprocedure Note    Patient: Ivis Ibarra  MRN: [id number]  YOB: 1970  Date of evaluation: 10/23/2024    Procedure Summary       Date: 10/23/24 Room / Location: [place] [address] / [facility]    Anesthesia Start: 1433 Anesthesia Stop: 1746    Procedure: LUMBAR 2-LUMBAR 3 AND LUMBAR 3-LUMBAR 4 DECOMPRESSION FUSION FIXATION-TRANSFORAMINAL LUMBAR INTERBODY FUSION LUMBAR 2-LUMBAR 3 (Spine Lumbar) Diagnosis:       Mechanical breakdown of internal fixation device of bone of extremity, initial encounter ([facility])      Lumbar stenosis with neurogenic claudication      (Mechanical breakdown of internal fixation device of bone of extremity, initial encounter ([facility]) [T84.119A])      (Lumbar stenosis with neurogenic claudication [M48.062])    Surgeons: Tramaine Wren MD Responsible Provider: Brian Trinidad MD    Anesthesia Type: General, TIVA ASA Status: 2            Anesthesia Type: General, TIVA    Luma Phase I: Luma Score: 5    Luma Phase II:      Anesthesia Post Evaluation    Patient location during evaluation: PACU  Patient participation: complete - patient participated  Level of consciousness: awake  Pain score: 2  Airway patency: patent  Cardiovascular status: blood pressure returned to baseline  Respiratory status: acceptable  Hydration status: euvolemic  Pain management: adequate    No notable events documented.

## 2024-10-24 ENCOUNTER — APPOINTMENT (OUTPATIENT)
Dept: GENERAL RADIOLOGY | Age: 54
DRG: 427 | End: 2024-10-24
Attending: NEUROLOGICAL SURGERY
Payer: MEDICARE

## 2024-10-24 LAB
ALBUMIN SERPL-MCNC: 3.6 G/DL (ref 3.4–5)
ANION GAP SERPL CALCULATED.3IONS-SCNC: 8 MMOL/L (ref 3–16)
BASOPHILS # BLD: 0 K/UL (ref 0–0.2)
BASOPHILS NFR BLD: 0.5 %
BUN SERPL-MCNC: 5 MG/DL (ref 7–20)
CALCIUM SERPL-MCNC: 7.7 MG/DL (ref 8.3–10.6)
CHLORIDE SERPL-SCNC: 98 MMOL/L (ref 99–110)
CO2 SERPL-SCNC: 26 MMOL/L (ref 21–32)
CREAT SERPL-MCNC: <0.5 MG/DL (ref 0.6–1.1)
DEPRECATED RDW RBC AUTO: 17.5 % (ref 12.4–15.4)
EOSINOPHIL # BLD: 0 K/UL (ref 0–0.6)
EOSINOPHIL NFR BLD: 0 %
GFR SERPLBLD CREATININE-BSD FMLA CKD-EPI: >90 ML/MIN/{1.73_M2}
GLUCOSE SERPL-MCNC: 112 MG/DL (ref 70–99)
HCT VFR BLD AUTO: 29.4 % (ref 36–48)
HGB BLD-MCNC: 9.8 G/DL (ref 12–16)
LYMPHOCYTES # BLD: 1 K/UL (ref 1–5.1)
LYMPHOCYTES NFR BLD: 13 %
MCH RBC QN AUTO: 26.8 PG (ref 26–34)
MCHC RBC AUTO-ENTMCNC: 33.3 G/DL (ref 31–36)
MCV RBC AUTO: 80.6 FL (ref 80–100)
MONOCYTES # BLD: 0.9 K/UL (ref 0–1.3)
MONOCYTES NFR BLD: 12 %
MRSA DNA SPEC QL NAA+PROBE: ABNORMAL
NEUTROPHILS # BLD: 5.4 K/UL (ref 1.7–7.7)
NEUTROPHILS NFR BLD: 74.5 %
ORGANISM: ABNORMAL
PHOSPHATE SERPL-MCNC: 3 MG/DL (ref 2.5–4.9)
PLATELET # BLD AUTO: 154 K/UL (ref 135–450)
PMV BLD AUTO: 8.7 FL (ref 5–10.5)
POTASSIUM SERPL-SCNC: 4 MMOL/L (ref 3.5–5.1)
RBC # BLD AUTO: 3.64 M/UL (ref 4–5.2)
SODIUM SERPL-SCNC: 132 MMOL/L (ref 136–145)
WBC # BLD AUTO: 7.3 K/UL (ref 4–11)

## 2024-10-24 PROCEDURE — 94150 VITAL CAPACITY TEST: CPT

## 2024-10-24 PROCEDURE — 97535 SELF CARE MNGMENT TRAINING: CPT

## 2024-10-24 PROCEDURE — 97166 OT EVAL MOD COMPLEX 45 MIN: CPT

## 2024-10-24 PROCEDURE — 97116 GAIT TRAINING THERAPY: CPT

## 2024-10-24 PROCEDURE — 6370000000 HC RX 637 (ALT 250 FOR IP): Performed by: PHYSICIAN ASSISTANT

## 2024-10-24 PROCEDURE — 80069 RENAL FUNCTION PANEL: CPT

## 2024-10-24 PROCEDURE — 2060000000 HC ICU INTERMEDIATE R&B

## 2024-10-24 PROCEDURE — 97162 PT EVAL MOD COMPLEX 30 MIN: CPT

## 2024-10-24 PROCEDURE — 72100 X-RAY EXAM L-S SPINE 2/3 VWS: CPT

## 2024-10-24 PROCEDURE — 2580000003 HC RX 258: Performed by: PHYSICIAN ASSISTANT

## 2024-10-24 PROCEDURE — 6360000002 HC RX W HCPCS: Performed by: PHYSICIAN ASSISTANT

## 2024-10-24 PROCEDURE — 85025 COMPLETE CBC W/AUTO DIFF WBC: CPT

## 2024-10-24 PROCEDURE — 6370000000 HC RX 637 (ALT 250 FOR IP)

## 2024-10-24 RX ADMIN — ENOXAPARIN SODIUM 40 MG: 100 INJECTION SUBCUTANEOUS at 09:56

## 2024-10-24 RX ADMIN — SODIUM CHLORIDE, PRESERVATIVE FREE 10 ML: 5 INJECTION INTRAVENOUS at 10:01

## 2024-10-24 RX ADMIN — OXYCODONE 10 MG: 5 TABLET ORAL at 21:59

## 2024-10-24 RX ADMIN — ACETAMINOPHEN 650 MG: 325 TABLET ORAL at 06:13

## 2024-10-24 RX ADMIN — CEFAZOLIN 2000 MG: 2 INJECTION, POWDER, FOR SOLUTION INTRAVENOUS at 06:14

## 2024-10-24 RX ADMIN — METHOCARBAMOL TABLETS 750 MG: 750 TABLET, COATED ORAL at 06:14

## 2024-10-24 RX ADMIN — ACETAMINOPHEN 650 MG: 325 TABLET ORAL at 09:56

## 2024-10-24 RX ADMIN — TORSEMIDE 20 MG: 20 TABLET ORAL at 09:56

## 2024-10-24 RX ADMIN — HYDROMORPHONE HYDROCHLORIDE 0.5 MG: 1 INJECTION, SOLUTION INTRAMUSCULAR; INTRAVENOUS; SUBCUTANEOUS at 01:31

## 2024-10-24 RX ADMIN — HYDROMORPHONE HYDROCHLORIDE 0.5 MG: 1 INJECTION, SOLUTION INTRAMUSCULAR; INTRAVENOUS; SUBCUTANEOUS at 11:00

## 2024-10-24 RX ADMIN — SODIUM CHLORIDE, PRESERVATIVE FREE 10 ML: 5 INJECTION INTRAVENOUS at 19:54

## 2024-10-24 RX ADMIN — POLYETHYLENE GLYCOL 3350 17 G: 17 POWDER, FOR SOLUTION ORAL at 10:01

## 2024-10-24 RX ADMIN — FAMOTIDINE 20 MG: 20 TABLET, FILM COATED ORAL at 19:52

## 2024-10-24 RX ADMIN — METHOCARBAMOL TABLETS 750 MG: 750 TABLET, COATED ORAL at 14:38

## 2024-10-24 RX ADMIN — OXYCODONE 10 MG: 5 TABLET ORAL at 14:37

## 2024-10-24 RX ADMIN — ACETAMINOPHEN 650 MG: 325 TABLET ORAL at 16:14

## 2024-10-24 RX ADMIN — HYDROMORPHONE HYDROCHLORIDE 0.5 MG: 1 INJECTION, SOLUTION INTRAMUSCULAR; INTRAVENOUS; SUBCUTANEOUS at 19:52

## 2024-10-24 RX ADMIN — ACETAMINOPHEN 650 MG: 325 TABLET ORAL at 19:52

## 2024-10-24 RX ADMIN — FAMOTIDINE 20 MG: 20 TABLET, FILM COATED ORAL at 09:56

## 2024-10-24 RX ADMIN — OXYCODONE 10 MG: 5 TABLET ORAL at 09:56

## 2024-10-24 ASSESSMENT — PAIN DESCRIPTION - ORIENTATION
ORIENTATION: MID
ORIENTATION: MID;LOWER
ORIENTATION: LOWER;MID
ORIENTATION: MID;LOWER
ORIENTATION: POSTERIOR;MID
ORIENTATION: POSTERIOR

## 2024-10-24 ASSESSMENT — PAIN SCALES - GENERAL
PAINLEVEL_OUTOF10: 4
PAINLEVEL_OUTOF10: 7
PAINLEVEL_OUTOF10: 6
PAINLEVEL_OUTOF10: 0
PAINLEVEL_OUTOF10: 7
PAINLEVEL_OUTOF10: 5
PAINLEVEL_OUTOF10: 9
PAINLEVEL_OUTOF10: 4
PAINLEVEL_OUTOF10: 7
PAINLEVEL_OUTOF10: 4

## 2024-10-24 ASSESSMENT — PAIN DESCRIPTION - FREQUENCY
FREQUENCY: CONTINUOUS

## 2024-10-24 ASSESSMENT — PAIN - FUNCTIONAL ASSESSMENT
PAIN_FUNCTIONAL_ASSESSMENT: ACTIVITIES ARE NOT PREVENTED
PAIN_FUNCTIONAL_ASSESSMENT: PREVENTS OR INTERFERES SOME ACTIVE ACTIVITIES AND ADLS

## 2024-10-24 ASSESSMENT — PAIN SCALES - WONG BAKER
WONGBAKER_NUMERICALRESPONSE: HURTS WHOLE LOT
WONGBAKER_NUMERICALRESPONSE: HURTS LITTLE MORE
WONGBAKER_NUMERICALRESPONSE: HURTS LITTLE MORE

## 2024-10-24 ASSESSMENT — PAIN DESCRIPTION - LOCATION
LOCATION: BACK
LOCATION: BACK
LOCATION: BACK;HEAD
LOCATION: BACK

## 2024-10-24 ASSESSMENT — PAIN DESCRIPTION - DESCRIPTORS
DESCRIPTORS: ACHING;DISCOMFORT
DESCRIPTORS: ACHING;THROBBING;STABBING
DESCRIPTORS: ACHING;THROBBING
DESCRIPTORS: ACHING;DISCOMFORT
DESCRIPTORS: ACHING;THROBBING;STABBING;SHARP
DESCRIPTORS: ACHING

## 2024-10-24 ASSESSMENT — PAIN DESCRIPTION - PAIN TYPE
TYPE: SURGICAL PAIN

## 2024-10-24 ASSESSMENT — PAIN DESCRIPTION - ONSET
ONSET: PROGRESSIVE
ONSET: ON-GOING
ONSET: ON-GOING

## 2024-10-24 NOTE — PLAN OF CARE
Called to bedside by patient's RN with reports of swelling at the hemovac drain insertion site.  Patient is a POD 0 L2-L3 and L3-L4 decompression fusion fixation-trans foraminal lumbar interbody fusion with Dr. Wren. I was notified by the 5T RN that the PACU nurse stated that the swelling that is present now, was not previously seen immediately postoperatively.  Full neurologic exam performed.  See below.  Patient was noted to have swelling along the insertion site of the Hemovac drain, with fluid collection that felt slightly boggy to palpation and was slightly tender to touch for the patient.  No actual bleeding was noted coming from around the Hemovac drain insertion site once the postoperative dressing was taken down. However, it did feel like there was a small hematoma that had formed.  Direct pressure was held to the Hemovac drain site for more than 20 minutes by patient's nurse.  She had initially held pressure 10 to 15 minutes prior to my arrival and after assessing the patient, I had her hold additional pressure thereafter. Post operative CT lumbar spine upgraded to STAT.    Of note, at time of my exam, patient was only complaining of 5 and back pain.  She states that she did have some numbness and tingling in her legs, however she states that this was present prior to her surgery today and that it does not feel any different compared to how she was preoperatively.  She denies saddle anesthesia, urinary or fecal incontinence.  Denies urinary retention, or feeling like she is not able to completely empty her bladder.    PHYSICAL EXAM:  Vitals:    10/23/24 1945 10/23/24 2000 10/23/24 2100 10/23/24 2156   BP: (!) 157/89 (!) 168/115 (!) 160/97    Pulse: 84 98 99    Resp: (!) 9 17 17 17   Temp:  98 °F (36.7 °C) 97.5 °F (36.4 °C)    TempSrc:  Oral Oral    SpO2:  98% 95%    Weight:       Height:             General: Alert, no distress, well-nourished  Neurologic  Mental status:   orientation to person, place,

## 2024-10-24 NOTE — PLAN OF CARE
Problem: Pain  Goal: Verbalizes/displays adequate comfort level or baseline comfort level  Outcome: Progressing  Pt endorsing pain to back. Being treated with PRN pain medication, rest, and frequent repositioning with pillow support for comfort and pressure relief. Pt reports some relief from pain with above interventions.      Problem: Safety - Adult  Goal: Free from fall injury  Outcome: Progressing  All fall precautions in place. Bed locked and in lowest position with alarm on. Overbed table and personal belonings within reach. Call light within reach and patient instructed to use call light for assistance. Non-skid socks on.       Problem: ABCDS Injury Assessment  Goal: Absence of physical injury  Outcome: Progressing

## 2024-10-24 NOTE — CARE COORDINATION
Case Management Assessment  Initial Evaluation    Date/Time of Evaluation: 10/24/2024 11:33 AM  Assessment Completed by: Maria D De Leon RN    If patient is discharged prior to next notation, then this note serves as note for discharge by case management.    Patient Name: Ivis Ibarra                   YOB: 1970  Diagnosis: Mechanical breakdown of internal fixation device of bone of extremity, initial encounter ([facility]) [T84.119A]  Lumbar stenosis with neurogenic claudication [M48.062]  Lumbar radiculopathy [M54.16]                   Date / Time: 10/23/2024 11:16 AM    Patient Admission Status: Inpatient   Readmission Risk (Low < 19, Mod (19-27), High > 27): Readmission Risk Score: 9    Current PCP: Alma Rosa Lagos MD  PCP verified by CM? Yes    Chart Reviewed: Yes      History Provided by: Patient, Medical Record  Patient Orientation: Alert and Oriented    Patient Cognition: Alert    Hospitalization in the last 30 days (Readmission):  No    If yes, Readmission Assessment in  Navigator will be completed.    Advance Directives:      Code Status: Full Code   Patient's Primary Decision Maker is: Legal Next of Kin      Discharge Planning:    Patient lives with: Alone (friend and family live near by) Type of Home: Apartment ([place])  Primary Care Giver: Self  Patient Support Systems include: Family Members, Friends/Neighbors   Current Financial resources: Medicaid, Medicare  Current community resources: None  Current services prior to admission: Durable Medical Equipment            Current DME: Cane            Type of Home Care services:  None    ADLS  Prior functional level: Independent in ADLs/IADLs  Current functional level: Independent in ADLs/IADLs    PT AM-PAC:   /24  OT AM-PAC: 18 /24    Family can provide assistance at DC: Yes  Would you like Case Management to discuss the discharge plan with any other family members/significant others, and if so, who? No  Plans to Return to Present Housing:

## 2024-10-24 NOTE — PLAN OF CARE
Problem: Pain  Goal: Verbalizes/displays adequate comfort level or baseline comfort level  10/24/2024 1928 by Tori Landry, RN  Outcome: Progressing   Pt not complaining of pain at this time. Being treated with PRN pain medication, rest, and frequent repositioning with pillow support for comfort and pressure relief. Pt reports some relief from pain with above interventions.  Problem: Safety - Adult  Goal: Free from fall injury  10/24/2024 1928 by Tori Landry, RN  Outcome: Progressing   All fall precautions in place. Bed locked and in lowest position with alarm on. Overbed table and personal belonings within reach. Call light within reach and patient instructed to use call light for assistance. Non-skid socks on.

## 2024-10-24 NOTE — PLAN OF CARE
Problem: Pain  Goal: Verbalizes/displays adequate comfort level or baseline comfort level  10/24/2024 0754 by Altagracia Franco, RN  Outcome: Progressing  Pt's pain being controlled with scheduled and PRN pain meds per MAR.      Problem: Safety - Adult  Goal: Free from fall injury  10/24/2024 0754 by Altagracia Franco, RN  Outcome: Progressing   All fall precautions in place. Bed locked and in lowest position with alarm on. Overbed table and personal belonings within reach. Call light within reach and patient instructed to use call light for assistance. Non-skid socks on.

## 2024-10-24 NOTE — CONSULTS
V2.0  [facility] [place]      Name:  Ivis Ibarra /Age/Sex: 1970  (53 y.o. female)   MRN & CSN:  [id number] & [id number] Encounter Date/Time: 10/24/2024 3:51 PM EDT   Location:  [address] [place] [address]- PCP: Alma Rosa Lagos MD     Attending:Tramaine Wren MD  Consulting Provider: Johann Hanley MD      Hospital Day: 2    Assessment and Recommendations   Ivis Ibarra is a 53 y.o. female with pmh of CVID who presents with Lumbar radiculopathy    Hospital Problems             Last Modified POA    * (Principal) Lumbar radiculopathy 10/23/2024 Yes   #CVID    Recommendations:   NSGY primary  Analgesia prn  PT/OT  Vitals and labs reviewed and stable  Okay to continue home medications  Dispo per NSGY      Diet ADULT DIET; Regular   DVT Prophylaxis [] Lovenox, []  Heparin, [] SCDs, [] Ambulation,  [] Eliquis, [] Xarelto   Code Status Full Code   Surrogate Decision Maker/ POKIZZY Bustos      Personally reviewed Lab Studies and Imaging       History From:    History obtained from the patient.     History of Present Illness:      Chief Complaint: Back Pain    Ivis Ibarra is a 53 y.o. female who presented for elective L2-4 decompression/fusion with NSGY. Hospitalist service consulted for aid in carl-op medical management.    No acute events reported overnight. Vitals stable. No new symptoms reported.      Review of Systems:      Pertinent positives and negatives discussed in HPI    Objective:     Intake/Output Summary (Last 24 hours) at 10/24/2024 1551  Last data filed at 10/24/2024 0624  Gross per 24 hour   Intake 1000 ml   Output 1320 ml   Net -320 ml      Vitals:   Vitals:    10/24/24 1100 10/24/24 1130 10/24/24 1437 10/24/24 1537   BP:  119/72     Pulse:  80     Resp: 16 16 16 16   Temp:  97.9 °F (36.6 °C)     TempSrc:  Oral     SpO2:  100%     Weight:       Height:             Physical Exam:      General: NAD  Eyes: EOMI  ENT: neck supple  Cardiovascular: Regular rate.  Respiratory: Clear to

## 2024-10-25 LAB
HCT VFR BLD AUTO: 27.5 % (ref 36–48)
HCT VFR BLD AUTO: 28.9 % (ref 36–48)
HGB BLD-MCNC: 9.1 G/DL (ref 12–16)
HGB BLD-MCNC: 9.4 G/DL (ref 12–16)

## 2024-10-25 PROCEDURE — 97110 THERAPEUTIC EXERCISES: CPT

## 2024-10-25 PROCEDURE — 6370000000 HC RX 637 (ALT 250 FOR IP): Performed by: INTERNAL MEDICINE

## 2024-10-25 PROCEDURE — 6370000000 HC RX 637 (ALT 250 FOR IP): Performed by: NURSE PRACTITIONER

## 2024-10-25 PROCEDURE — 85014 HEMATOCRIT: CPT

## 2024-10-25 PROCEDURE — 6370000000 HC RX 637 (ALT 250 FOR IP)

## 2024-10-25 PROCEDURE — 2580000003 HC RX 258: Performed by: PHYSICIAN ASSISTANT

## 2024-10-25 PROCEDURE — 6370000000 HC RX 637 (ALT 250 FOR IP): Performed by: PHYSICIAN ASSISTANT

## 2024-10-25 PROCEDURE — 2060000000 HC ICU INTERMEDIATE R&B

## 2024-10-25 PROCEDURE — 6360000002 HC RX W HCPCS: Performed by: NURSE PRACTITIONER

## 2024-10-25 PROCEDURE — APPNB30 APP NON BILLABLE TIME 0-30 MINS: Performed by: NURSE PRACTITIONER

## 2024-10-25 PROCEDURE — 6360000002 HC RX W HCPCS: Performed by: PHYSICIAN ASSISTANT

## 2024-10-25 PROCEDURE — 85018 HEMOGLOBIN: CPT

## 2024-10-25 PROCEDURE — 97530 THERAPEUTIC ACTIVITIES: CPT

## 2024-10-25 PROCEDURE — 36415 COLL VENOUS BLD VENIPUNCTURE: CPT

## 2024-10-25 PROCEDURE — 97535 SELF CARE MNGMENT TRAINING: CPT

## 2024-10-25 PROCEDURE — 99024 POSTOP FOLLOW-UP VISIT: CPT | Performed by: NURSE PRACTITIONER

## 2024-10-25 PROCEDURE — 97116 GAIT TRAINING THERAPY: CPT

## 2024-10-25 RX ORDER — DIAZEPAM 5 MG/1
5 TABLET ORAL EVERY 6 HOURS PRN
Status: DISCONTINUED | OUTPATIENT
Start: 2024-10-25 | End: 2024-10-27 | Stop reason: HOSPADM

## 2024-10-25 RX ORDER — METHOCARBAMOL 750 MG/1
750 TABLET, FILM COATED ORAL EVERY 6 HOURS
Status: DISCONTINUED | OUTPATIENT
Start: 2024-10-25 | End: 2024-10-27 | Stop reason: HOSPADM

## 2024-10-25 RX ORDER — SENNA AND DOCUSATE SODIUM 50; 8.6 MG/1; MG/1
2 TABLET, FILM COATED ORAL 2 TIMES DAILY
Status: DISCONTINUED | OUTPATIENT
Start: 2024-10-25 | End: 2024-10-27 | Stop reason: HOSPADM

## 2024-10-25 RX ADMIN — FAMOTIDINE 20 MG: 20 TABLET, FILM COATED ORAL at 09:10

## 2024-10-25 RX ADMIN — ACETAMINOPHEN 650 MG: 325 TABLET ORAL at 09:10

## 2024-10-25 RX ADMIN — SENNOSIDES AND DOCUSATE SODIUM 2 TABLET: 50; 8.6 TABLET ORAL at 19:40

## 2024-10-25 RX ADMIN — SENNOSIDES AND DOCUSATE SODIUM 2 TABLET: 50; 8.6 TABLET ORAL at 14:57

## 2024-10-25 RX ADMIN — TORSEMIDE 20 MG: 20 TABLET ORAL at 09:10

## 2024-10-25 RX ADMIN — FAMOTIDINE 20 MG: 20 TABLET, FILM COATED ORAL at 19:40

## 2024-10-25 RX ADMIN — OXYCODONE 10 MG: 5 TABLET ORAL at 11:16

## 2024-10-25 RX ADMIN — OXYCODONE 10 MG: 5 TABLET ORAL at 17:45

## 2024-10-25 RX ADMIN — POLYETHYLENE GLYCOL 3350 17 G: 17 POWDER, FOR SOLUTION ORAL at 09:10

## 2024-10-25 RX ADMIN — HYDROMORPHONE HYDROCHLORIDE 0.5 MG: 1 INJECTION, SOLUTION INTRAMUSCULAR; INTRAVENOUS; SUBCUTANEOUS at 13:07

## 2024-10-25 RX ADMIN — ACETAMINOPHEN 650 MG: 325 TABLET ORAL at 23:10

## 2024-10-25 RX ADMIN — HYDROMORPHONE HYDROCHLORIDE 0.5 MG: 1 INJECTION, SOLUTION INTRAMUSCULAR; INTRAVENOUS; SUBCUTANEOUS at 09:10

## 2024-10-25 RX ADMIN — ACETAMINOPHEN 650 MG: 325 TABLET ORAL at 03:10

## 2024-10-25 RX ADMIN — HYDROMORPHONE HYDROCHLORIDE 0.5 MG: 1 INJECTION, SOLUTION INTRAMUSCULAR; INTRAVENOUS; SUBCUTANEOUS at 06:04

## 2024-10-25 RX ADMIN — METHOCARBAMOL TABLETS 750 MG: 750 TABLET, COATED ORAL at 23:10

## 2024-10-25 RX ADMIN — SODIUM CHLORIDE, PRESERVATIVE FREE 10 ML: 5 INJECTION INTRAVENOUS at 09:16

## 2024-10-25 RX ADMIN — HYDROMORPHONE HYDROCHLORIDE 0.5 MG: 1 INJECTION, SOLUTION INTRAMUSCULAR; INTRAVENOUS; SUBCUTANEOUS at 19:40

## 2024-10-25 RX ADMIN — ENOXAPARIN SODIUM 40 MG: 100 INJECTION SUBCUTANEOUS at 09:10

## 2024-10-25 RX ADMIN — METHOCARBAMOL TABLETS 750 MG: 750 TABLET, COATED ORAL at 17:45

## 2024-10-25 RX ADMIN — OXYCODONE 10 MG: 5 TABLET ORAL at 04:07

## 2024-10-25 RX ADMIN — ACETAMINOPHEN 650 MG: 325 TABLET ORAL at 14:57

## 2024-10-25 ASSESSMENT — PAIN DESCRIPTION - LOCATION
LOCATION: BACK

## 2024-10-25 ASSESSMENT — PAIN SCALES - WONG BAKER
WONGBAKER_NUMERICALRESPONSE: HURTS A LITTLE BIT
WONGBAKER_NUMERICALRESPONSE: HURTS WHOLE LOT

## 2024-10-25 ASSESSMENT — PAIN SCALES - GENERAL
PAINLEVEL_OUTOF10: 7
PAINLEVEL_OUTOF10: 9
PAINLEVEL_OUTOF10: 3
PAINLEVEL_OUTOF10: 7
PAINLEVEL_OUTOF10: 2
PAINLEVEL_OUTOF10: 2
PAINLEVEL_OUTOF10: 7
PAINLEVEL_OUTOF10: 4
PAINLEVEL_OUTOF10: 5
PAINLEVEL_OUTOF10: 7
PAINLEVEL_OUTOF10: 6
PAINLEVEL_OUTOF10: 7

## 2024-10-25 ASSESSMENT — PAIN DESCRIPTION - FREQUENCY
FREQUENCY: CONTINUOUS

## 2024-10-25 ASSESSMENT — PAIN DESCRIPTION - ONSET
ONSET: ON-GOING
ONSET: PROGRESSIVE
ONSET: ON-GOING

## 2024-10-25 ASSESSMENT — PAIN DESCRIPTION - ORIENTATION
ORIENTATION: LOWER;MID
ORIENTATION: POSTERIOR;MID
ORIENTATION: LOWER;MID
ORIENTATION: LOWER;MID
ORIENTATION: MID;LOWER
ORIENTATION: MID;POSTERIOR
ORIENTATION: LOWER;MID

## 2024-10-25 ASSESSMENT — PAIN - FUNCTIONAL ASSESSMENT
PAIN_FUNCTIONAL_ASSESSMENT: ACTIVITIES ARE NOT PREVENTED
PAIN_FUNCTIONAL_ASSESSMENT: ACTIVITIES ARE NOT PREVENTED
PAIN_FUNCTIONAL_ASSESSMENT: PREVENTS OR INTERFERES SOME ACTIVE ACTIVITIES AND ADLS
PAIN_FUNCTIONAL_ASSESSMENT: ACTIVITIES ARE NOT PREVENTED

## 2024-10-25 ASSESSMENT — PAIN DESCRIPTION - DESCRIPTORS
DESCRIPTORS: ACHING;THROBBING;STABBING
DESCRIPTORS: ACHING;THROBBING;STABBING
DESCRIPTORS: THROBBING;STABBING
DESCRIPTORS: THROBBING;STABBING
DESCRIPTORS: ACHING;DISCOMFORT
DESCRIPTORS: ACHING;DISCOMFORT
DESCRIPTORS: SHARP;ACHING;DISCOMFORT

## 2024-10-25 ASSESSMENT — PAIN DESCRIPTION - PAIN TYPE
TYPE: SURGICAL PAIN

## 2024-10-25 NOTE — PLAN OF CARE
Problem: Pain  Goal: Verbalizes/displays adequate comfort level or baseline comfort level  10/25/2024 0709 by Altagracia Franco, RN  Outcome: Progressing  Pain is being managed with scheduled and PRN pain meds per MAR.     Problem: Safety - Adult  Goal: Free from fall injury  10/25/2024 0709 by Altagracia Franco, RN  Outcome: Progressing   All fall precautions in place. Bed locked and in lowest position with alarm on. Overbed table and personal belonings within reach. Call light within reach and patient instructed to use call light for assistance. Non-skid socks on.

## 2024-10-25 NOTE — CARE COORDINATION
CM spoke with patient.  She plans to return home at discharge, declined [facility], is interested in [employer] to deliver walker today.  Family to transport home.    Basia Rcio RN, BSN,    Ortho/Neuro   [phone]

## 2024-10-26 LAB
ANION GAP SERPL CALCULATED.3IONS-SCNC: 10 MMOL/L (ref 3–16)
BUN SERPL-MCNC: 4 MG/DL (ref 7–20)
CALCIUM SERPL-MCNC: 8.3 MG/DL (ref 8.3–10.6)
CHLORIDE SERPL-SCNC: 97 MMOL/L (ref 99–110)
CO2 SERPL-SCNC: 27 MMOL/L (ref 21–32)
CREAT SERPL-MCNC: <0.5 MG/DL (ref 0.6–1.1)
DEPRECATED RDW RBC AUTO: 17.5 % (ref 12.4–15.4)
GFR SERPLBLD CREATININE-BSD FMLA CKD-EPI: >90 ML/MIN/{1.73_M2}
GLUCOSE SERPL-MCNC: 124 MG/DL (ref 70–99)
HCT VFR BLD AUTO: 28.1 % (ref 36–48)
HGB BLD-MCNC: 9.1 G/DL (ref 12–16)
MAGNESIUM SERPL-MCNC: 1.8 MG/DL (ref 1.8–2.4)
MCH RBC QN AUTO: 26.4 PG (ref 26–34)
MCHC RBC AUTO-ENTMCNC: 32.4 G/DL (ref 31–36)
MCV RBC AUTO: 81.6 FL (ref 80–100)
PLATELET # BLD AUTO: 123 K/UL (ref 135–450)
PMV BLD AUTO: 8.3 FL (ref 5–10.5)
POTASSIUM SERPL-SCNC: 3.1 MMOL/L (ref 3.5–5.1)
RBC # BLD AUTO: 3.44 M/UL (ref 4–5.2)
SODIUM SERPL-SCNC: 134 MMOL/L (ref 136–145)
WBC # BLD AUTO: 3.8 K/UL (ref 4–11)

## 2024-10-26 PROCEDURE — 6360000002 HC RX W HCPCS: Performed by: INTERNAL MEDICINE

## 2024-10-26 PROCEDURE — 6360000002 HC RX W HCPCS: Performed by: NURSE PRACTITIONER

## 2024-10-26 PROCEDURE — 36415 COLL VENOUS BLD VENIPUNCTURE: CPT

## 2024-10-26 PROCEDURE — 2580000003 HC RX 258: Performed by: PHYSICIAN ASSISTANT

## 2024-10-26 PROCEDURE — 6370000000 HC RX 637 (ALT 250 FOR IP): Performed by: PHYSICIAN ASSISTANT

## 2024-10-26 PROCEDURE — 6370000000 HC RX 637 (ALT 250 FOR IP)

## 2024-10-26 PROCEDURE — 83735 ASSAY OF MAGNESIUM: CPT

## 2024-10-26 PROCEDURE — 6360000002 HC RX W HCPCS: Performed by: PHYSICIAN ASSISTANT

## 2024-10-26 PROCEDURE — 97530 THERAPEUTIC ACTIVITIES: CPT

## 2024-10-26 PROCEDURE — 2060000000 HC ICU INTERMEDIATE R&B

## 2024-10-26 PROCEDURE — 85027 COMPLETE CBC AUTOMATED: CPT

## 2024-10-26 PROCEDURE — APPNB30 APP NON BILLABLE TIME 0-30 MINS: Performed by: NURSE PRACTITIONER

## 2024-10-26 PROCEDURE — 6370000000 HC RX 637 (ALT 250 FOR IP): Performed by: INTERNAL MEDICINE

## 2024-10-26 PROCEDURE — 80048 BASIC METABOLIC PNL TOTAL CA: CPT

## 2024-10-26 PROCEDURE — 99024 POSTOP FOLLOW-UP VISIT: CPT | Performed by: NURSE PRACTITIONER

## 2024-10-26 PROCEDURE — 6370000000 HC RX 637 (ALT 250 FOR IP): Performed by: NURSE PRACTITIONER

## 2024-10-26 PROCEDURE — 97116 GAIT TRAINING THERAPY: CPT

## 2024-10-26 RX ORDER — POTASSIUM CHLORIDE 1500 MG/1
60 TABLET, EXTENDED RELEASE ORAL ONCE
Status: COMPLETED | OUTPATIENT
Start: 2024-10-26 | End: 2024-10-26

## 2024-10-26 RX ORDER — DEXAMETHASONE SODIUM PHOSPHATE 4 MG/ML
10 INJECTION, SOLUTION INTRA-ARTICULAR; INTRALESIONAL; INTRAMUSCULAR; INTRAVENOUS; SOFT TISSUE ONCE
Status: COMPLETED | OUTPATIENT
Start: 2024-10-26 | End: 2024-10-26

## 2024-10-26 RX ORDER — MAGNESIUM SULFATE IN WATER 40 MG/ML
2000 INJECTION, SOLUTION INTRAVENOUS ONCE
Status: COMPLETED | OUTPATIENT
Start: 2024-10-26 | End: 2024-10-26

## 2024-10-26 RX ORDER — POTASSIUM CHLORIDE 750 MG/1
30 TABLET, EXTENDED RELEASE ORAL DAILY
Status: DISCONTINUED | OUTPATIENT
Start: 2024-10-27 | End: 2024-10-27 | Stop reason: HOSPADM

## 2024-10-26 RX ADMIN — DEXAMETHASONE SODIUM PHOSPHATE 10 MG: 4 INJECTION, SOLUTION INTRAMUSCULAR; INTRAVENOUS at 12:24

## 2024-10-26 RX ADMIN — METHOCARBAMOL TABLETS 750 MG: 750 TABLET, COATED ORAL at 21:14

## 2024-10-26 RX ADMIN — ONDANSETRON 4 MG: 2 INJECTION INTRAMUSCULAR; INTRAVENOUS at 12:25

## 2024-10-26 RX ADMIN — ENOXAPARIN SODIUM 40 MG: 100 INJECTION SUBCUTANEOUS at 09:11

## 2024-10-26 RX ADMIN — ACETAMINOPHEN 650 MG: 325 TABLET ORAL at 09:11

## 2024-10-26 RX ADMIN — SODIUM CHLORIDE, PRESERVATIVE FREE 10 ML: 5 INJECTION INTRAVENOUS at 12:24

## 2024-10-26 RX ADMIN — POTASSIUM CHLORIDE 60 MEQ: 1500 TABLET, EXTENDED RELEASE ORAL at 09:11

## 2024-10-26 RX ADMIN — HYDROMORPHONE HYDROCHLORIDE 0.5 MG: 1 INJECTION, SOLUTION INTRAMUSCULAR; INTRAVENOUS; SUBCUTANEOUS at 13:34

## 2024-10-26 RX ADMIN — TORSEMIDE 20 MG: 20 TABLET ORAL at 09:11

## 2024-10-26 RX ADMIN — ACETAMINOPHEN 650 MG: 325 TABLET ORAL at 21:15

## 2024-10-26 RX ADMIN — SENNOSIDES AND DOCUSATE SODIUM 2 TABLET: 50; 8.6 TABLET ORAL at 21:15

## 2024-10-26 RX ADMIN — HYDROMORPHONE HYDROCHLORIDE 0.5 MG: 1 INJECTION, SOLUTION INTRAMUSCULAR; INTRAVENOUS; SUBCUTANEOUS at 09:11

## 2024-10-26 RX ADMIN — METHOCARBAMOL TABLETS 750 MG: 750 TABLET, COATED ORAL at 16:31

## 2024-10-26 RX ADMIN — OXYCODONE 10 MG: 5 TABLET ORAL at 15:22

## 2024-10-26 RX ADMIN — OXYCODONE 10 MG: 5 TABLET ORAL at 11:17

## 2024-10-26 RX ADMIN — SODIUM CHLORIDE, PRESERVATIVE FREE 10 ML: 5 INJECTION INTRAVENOUS at 21:17

## 2024-10-26 RX ADMIN — METHOCARBAMOL TABLETS 750 MG: 750 TABLET, COATED ORAL at 03:30

## 2024-10-26 RX ADMIN — ACETAMINOPHEN 650 MG: 325 TABLET ORAL at 15:22

## 2024-10-26 RX ADMIN — FAMOTIDINE 20 MG: 20 TABLET, FILM COATED ORAL at 21:16

## 2024-10-26 RX ADMIN — POLYETHYLENE GLYCOL 3350 17 G: 17 POWDER, FOR SOLUTION ORAL at 09:11

## 2024-10-26 RX ADMIN — OXYCODONE 10 MG: 5 TABLET ORAL at 06:59

## 2024-10-26 RX ADMIN — HYDROMORPHONE HYDROCHLORIDE 0.5 MG: 1 INJECTION, SOLUTION INTRAMUSCULAR; INTRAVENOUS; SUBCUTANEOUS at 03:30

## 2024-10-26 RX ADMIN — ACETAMINOPHEN 650 MG: 325 TABLET ORAL at 03:30

## 2024-10-26 RX ADMIN — HYDROMORPHONE HYDROCHLORIDE 0.5 MG: 1 INJECTION, SOLUTION INTRAMUSCULAR; INTRAVENOUS; SUBCUTANEOUS at 18:02

## 2024-10-26 RX ADMIN — SENNOSIDES AND DOCUSATE SODIUM 2 TABLET: 50; 8.6 TABLET ORAL at 09:11

## 2024-10-26 RX ADMIN — MAGNESIUM SULFATE HEPTAHYDRATE 2000 MG: 40 INJECTION, SOLUTION INTRAVENOUS at 09:39

## 2024-10-26 RX ADMIN — OXYCODONE 10 MG: 5 TABLET ORAL at 00:52

## 2024-10-26 RX ADMIN — OXYCODONE 10 MG: 5 TABLET ORAL at 21:22

## 2024-10-26 RX ADMIN — METHOCARBAMOL TABLETS 750 MG: 750 TABLET, COATED ORAL at 11:09

## 2024-10-26 RX ADMIN — FAMOTIDINE 20 MG: 20 TABLET, FILM COATED ORAL at 09:11

## 2024-10-26 ASSESSMENT — PAIN SCALES - GENERAL
PAINLEVEL_OUTOF10: 4
PAINLEVEL_OUTOF10: 5
PAINLEVEL_OUTOF10: 7
PAINLEVEL_OUTOF10: 6
PAINLEVEL_OUTOF10: 0
PAINLEVEL_OUTOF10: 6
PAINLEVEL_OUTOF10: 7
PAINLEVEL_OUTOF10: 5
PAINLEVEL_OUTOF10: 7
PAINLEVEL_OUTOF10: 7
PAINLEVEL_OUTOF10: 5
PAINLEVEL_OUTOF10: 6
PAINLEVEL_OUTOF10: 6
PAINLEVEL_OUTOF10: 5
PAINLEVEL_OUTOF10: 6
PAINLEVEL_OUTOF10: 2
PAINLEVEL_OUTOF10: 6
PAINLEVEL_OUTOF10: 6
PAINLEVEL_OUTOF10: 7
PAINLEVEL_OUTOF10: 4
PAINLEVEL_OUTOF10: 6

## 2024-10-26 ASSESSMENT — PAIN DESCRIPTION - ORIENTATION
ORIENTATION: LOWER;MID
ORIENTATION: MID;LOWER
ORIENTATION: LOWER
ORIENTATION: MID
ORIENTATION: LOWER
ORIENTATION: LOWER
ORIENTATION: MID;LOWER
ORIENTATION: LOWER;MID
ORIENTATION: MID;LOWER
ORIENTATION: LOWER

## 2024-10-26 ASSESSMENT — PAIN DESCRIPTION - FREQUENCY
FREQUENCY: CONTINUOUS

## 2024-10-26 ASSESSMENT — PAIN - FUNCTIONAL ASSESSMENT
PAIN_FUNCTIONAL_ASSESSMENT: PREVENTS OR INTERFERES SOME ACTIVE ACTIVITIES AND ADLS
PAIN_FUNCTIONAL_ASSESSMENT: ACTIVITIES ARE NOT PREVENTED

## 2024-10-26 ASSESSMENT — PAIN DESCRIPTION - DESCRIPTORS
DESCRIPTORS: ACHING;BURNING
DESCRIPTORS: SHARP;ACHING;DISCOMFORT
DESCRIPTORS: ACHING;BURNING
DESCRIPTORS: DULL;ACHING
DESCRIPTORS: BURNING;ACHING
DESCRIPTORS: SHARP;ACHING;DISCOMFORT
DESCRIPTORS: ACHING;BURNING;PRESSURE
DESCRIPTORS: SHARP;ACHING;DISCOMFORT

## 2024-10-26 ASSESSMENT — PAIN DESCRIPTION - ONSET
ONSET: ON-GOING
ONSET: PROGRESSIVE
ONSET: ON-GOING
ONSET: ON-GOING
ONSET: PROGRESSIVE
ONSET: ON-GOING
ONSET: ON-GOING
ONSET: PROGRESSIVE
ONSET: ON-GOING

## 2024-10-26 ASSESSMENT — PAIN DESCRIPTION - PAIN TYPE
TYPE: SURGICAL PAIN

## 2024-10-26 ASSESSMENT — PAIN DESCRIPTION - LOCATION
LOCATION: BACK

## 2024-10-26 ASSESSMENT — PAIN DESCRIPTION - DIRECTION
RADIATING_TOWARDS: THIGHS

## 2024-10-26 ASSESSMENT — PAIN SCALES - WONG BAKER: WONGBAKER_NUMERICALRESPONSE: NO HURT

## 2024-10-26 NOTE — PLAN OF CARE
Problem: Pain  Goal: Verbalizes/displays adequate comfort level or baseline comfort level  Outcome: Progressing  Note: Pt is encouraged to monitor pain and request assistance. Appropriate pain scale is being used.     Problem: Safety - Adult  Goal: Free from fall injury  Outcome: Progressing  Note: Pt is free of fall injury this shift. All proper safety precautions are in place. Call light is within reach. Bed alarm is on.

## 2024-10-26 NOTE — PLAN OF CARE
Problem: Pain  Goal: Verbalizes/displays adequate comfort level or baseline comfort level  Outcome: Progressing  Flowsheets (Taken 10/26/2024 1402)  Verbalizes/displays adequate comfort level or baseline comfort level:   Encourage patient to monitor pain and request assistance   Administer analgesics based on type and severity of pain and evaluate response   Assess pain using appropriate pain scale   Implement non-pharmacological measures as appropriate and evaluate response  Note: Endorsing pain in lower back, mnaged with prn and scheduled medications. Ice packs applied. Educated on medication schedule and to notify RN of worsening or new pain. Verbalizes understanding. Call light left within reach. Plan of care continues.     Problem: Safety - Adult  Goal: Free from fall injury  Outcome: Progressing  Note: Bed locked and lowered. Bed alarm on. Grippy socks on. TLSO brace when out of bed. Floor free from clutter. Call light left within reach. Plan of care continues.

## 2024-10-27 ENCOUNTER — APPOINTMENT (OUTPATIENT)
Dept: GENERAL RADIOLOGY | Age: 54
DRG: 427 | End: 2024-10-27
Attending: NEUROLOGICAL SURGERY
Payer: MEDICARE

## 2024-10-27 VITALS
OXYGEN SATURATION: 98 % | DIASTOLIC BLOOD PRESSURE: 71 MMHG | SYSTOLIC BLOOD PRESSURE: 107 MMHG | RESPIRATION RATE: 16 BRPM | TEMPERATURE: 97.5 F | WEIGHT: 168.8 LBS | HEIGHT: 61 IN | BODY MASS INDEX: 31.87 KG/M2 | HEART RATE: 80 BPM

## 2024-10-27 PROBLEM — Z98.1 S/P LUMBAR FUSION: Status: ACTIVE | Noted: 2024-10-23

## 2024-10-27 LAB
ANION GAP SERPL CALCULATED.3IONS-SCNC: 15 MMOL/L (ref 3–16)
BUN SERPL-MCNC: 5 MG/DL (ref 7–20)
CALCIUM SERPL-MCNC: 8.8 MG/DL (ref 8.3–10.6)
CHLORIDE SERPL-SCNC: 96 MMOL/L (ref 99–110)
CO2 SERPL-SCNC: 23 MMOL/L (ref 21–32)
CREAT SERPL-MCNC: <0.5 MG/DL (ref 0.6–1.1)
DEPRECATED RDW RBC AUTO: 17.9 % (ref 12.4–15.4)
GFR SERPLBLD CREATININE-BSD FMLA CKD-EPI: >90 ML/MIN/{1.73_M2}
GLUCOSE SERPL-MCNC: 89 MG/DL (ref 70–99)
HCT VFR BLD AUTO: 29.6 % (ref 36–48)
HGB BLD-MCNC: 9.6 G/DL (ref 12–16)
MCH RBC QN AUTO: 26.6 PG (ref 26–34)
MCHC RBC AUTO-ENTMCNC: 32.4 G/DL (ref 31–36)
MCV RBC AUTO: 82.2 FL (ref 80–100)
PLATELET # BLD AUTO: 178 K/UL (ref 135–450)
PMV BLD AUTO: 8.8 FL (ref 5–10.5)
POTASSIUM SERPL-SCNC: 3.9 MMOL/L (ref 3.5–5.1)
RBC # BLD AUTO: 3.61 M/UL (ref 4–5.2)
SODIUM SERPL-SCNC: 134 MMOL/L (ref 136–145)
WBC # BLD AUTO: 4.5 K/UL (ref 4–11)

## 2024-10-27 PROCEDURE — 6360000002 HC RX W HCPCS: Performed by: PHYSICIAN ASSISTANT

## 2024-10-27 PROCEDURE — 80048 BASIC METABOLIC PNL TOTAL CA: CPT

## 2024-10-27 PROCEDURE — 6370000000 HC RX 637 (ALT 250 FOR IP): Performed by: NURSE PRACTITIONER

## 2024-10-27 PROCEDURE — 74018 RADEX ABDOMEN 1 VIEW: CPT

## 2024-10-27 PROCEDURE — 6370000000 HC RX 637 (ALT 250 FOR IP)

## 2024-10-27 PROCEDURE — 36415 COLL VENOUS BLD VENIPUNCTURE: CPT

## 2024-10-27 PROCEDURE — 6370000000 HC RX 637 (ALT 250 FOR IP): Performed by: INTERNAL MEDICINE

## 2024-10-27 PROCEDURE — 85027 COMPLETE CBC AUTOMATED: CPT

## 2024-10-27 PROCEDURE — 6370000000 HC RX 637 (ALT 250 FOR IP): Performed by: PHYSICIAN ASSISTANT

## 2024-10-27 PROCEDURE — 2580000003 HC RX 258: Performed by: PHYSICIAN ASSISTANT

## 2024-10-27 PROCEDURE — 6360000002 HC RX W HCPCS: Performed by: NURSE PRACTITIONER

## 2024-10-27 RX ORDER — POLYETHYLENE GLYCOL 3350 17 G/17G
17 POWDER, FOR SOLUTION ORAL DAILY PRN
COMMUNITY
Start: 2024-10-27 | End: 2024-11-26

## 2024-10-27 RX ORDER — HYDROMORPHONE HYDROCHLORIDE 1 MG/ML
0.5 INJECTION, SOLUTION INTRAMUSCULAR; INTRAVENOUS; SUBCUTANEOUS ONCE
Status: COMPLETED | OUTPATIENT
Start: 2024-10-27 | End: 2024-10-27

## 2024-10-27 RX ORDER — HYDROMORPHONE HYDROCHLORIDE 2 MG/1
2-4 TABLET ORAL EVERY 4 HOURS PRN
Qty: 36 TABLET | Refills: 0 | Status: SHIPPED | OUTPATIENT
Start: 2024-10-27 | End: 2024-11-03

## 2024-10-27 RX ORDER — METHOCARBAMOL 750 MG/1
750 TABLET, FILM COATED ORAL EVERY 6 HOURS
Qty: 40 TABLET | Refills: 0 | Status: SHIPPED | OUTPATIENT
Start: 2024-10-27 | End: 2024-11-06

## 2024-10-27 RX ADMIN — SODIUM CHLORIDE, PRESERVATIVE FREE 10 ML: 5 INJECTION INTRAVENOUS at 10:57

## 2024-10-27 RX ADMIN — ACETAMINOPHEN 650 MG: 325 TABLET ORAL at 09:46

## 2024-10-27 RX ADMIN — POTASSIUM CHLORIDE 30 MEQ: 750 TABLET, EXTENDED RELEASE ORAL at 09:46

## 2024-10-27 RX ADMIN — HYDROMORPHONE HYDROCHLORIDE 0.5 MG: 1 INJECTION, SOLUTION INTRAMUSCULAR; INTRAVENOUS; SUBCUTANEOUS at 10:52

## 2024-10-27 RX ADMIN — ACETAMINOPHEN 650 MG: 325 TABLET ORAL at 03:34

## 2024-10-27 RX ADMIN — SENNOSIDES AND DOCUSATE SODIUM 2 TABLET: 50; 8.6 TABLET ORAL at 09:47

## 2024-10-27 RX ADMIN — METHOCARBAMOL TABLETS 750 MG: 750 TABLET, COATED ORAL at 03:34

## 2024-10-27 RX ADMIN — OXYCODONE 10 MG: 5 TABLET ORAL at 09:47

## 2024-10-27 RX ADMIN — OXYCODONE 10 MG: 5 TABLET ORAL at 05:26

## 2024-10-27 RX ADMIN — TORSEMIDE 20 MG: 20 TABLET ORAL at 09:47

## 2024-10-27 RX ADMIN — OXYCODONE 10 MG: 5 TABLET ORAL at 01:00

## 2024-10-27 RX ADMIN — ENOXAPARIN SODIUM 40 MG: 100 INJECTION SUBCUTANEOUS at 09:46

## 2024-10-27 RX ADMIN — FAMOTIDINE 20 MG: 20 TABLET, FILM COATED ORAL at 09:47

## 2024-10-27 ASSESSMENT — PAIN DESCRIPTION - DESCRIPTORS
DESCRIPTORS: ACHING;BURNING
DESCRIPTORS: DISCOMFORT;ACHING
DESCRIPTORS: ACHING;DISCOMFORT
DESCRIPTORS: SHARP;ACHING;BURNING
DESCRIPTORS: DISCOMFORT
DESCRIPTORS: DISCOMFORT;ACHING

## 2024-10-27 ASSESSMENT — PAIN SCALES - GENERAL
PAINLEVEL_OUTOF10: 4
PAINLEVEL_OUTOF10: 6
PAINLEVEL_OUTOF10: 7
PAINLEVEL_OUTOF10: 6
PAINLEVEL_OUTOF10: 2
PAINLEVEL_OUTOF10: 6
PAINLEVEL_OUTOF10: 7
PAINLEVEL_OUTOF10: 6

## 2024-10-27 ASSESSMENT — PAIN DESCRIPTION - PAIN TYPE
TYPE: SURGICAL PAIN

## 2024-10-27 ASSESSMENT — PAIN DESCRIPTION - ORIENTATION
ORIENTATION: LOWER;MID
ORIENTATION: LOWER;MID
ORIENTATION: MID;LOWER
ORIENTATION: LOWER
ORIENTATION: LOWER;MID

## 2024-10-27 ASSESSMENT — PAIN DESCRIPTION - ONSET
ONSET: ON-GOING

## 2024-10-27 ASSESSMENT — PAIN - FUNCTIONAL ASSESSMENT
PAIN_FUNCTIONAL_ASSESSMENT: ACTIVITIES ARE NOT PREVENTED

## 2024-10-27 ASSESSMENT — PAIN DESCRIPTION - FREQUENCY
FREQUENCY: CONTINUOUS

## 2024-10-27 ASSESSMENT — PAIN DESCRIPTION - DIRECTION
RADIATING_TOWARDS: THIGHS

## 2024-10-27 ASSESSMENT — PAIN DESCRIPTION - LOCATION
LOCATION: BACK

## 2024-10-27 ASSESSMENT — PAIN SCALES - WONG BAKER: WONGBAKER_NUMERICALRESPONSE: HURTS A LITTLE BIT

## 2024-10-27 NOTE — DISCHARGE INSTR - COC
{Prognosis:[id number]}    Recommended Labs or Other Treatments After Discharge: ***    Physician Certification: I certify the above information and transfer of Ivis Ibarra  is necessary for the continuing treatment of the diagnosis listed and that she requires {Admit to Appropriate Level of Care:70425} for {GREATER/LESS:059064282} 30 days.     Update Admission H&P: {CHP DME Changes in HandP:196590623}    PHYSICIAN SIGNATURE:  {Esignature:[id number]}

## 2024-10-27 NOTE — PROGRESS NOTES
[facility] PRE-SURGICAL TESTING INSTRUCTIONS                      PRIOR TO PROCEDURE DATE:    1. PLEASE FOLLOW ANY INSTRUCTIONS GIVEN TO YOU PER YOUR SURGEON.      2. Arrange for someone to drive you home and be with you for the first 24 hours after discharge for your safety after your procedure for which you received sedation. Ensure it is someone we can share information with regarding your discharge.     NOTE: At this time ONLY 2 ADULTS may accompany you   One person ENCOURAGED to stay at hospital entire time if outpatient surgery      3. You must contact your surgeon for instructions IF:  You are taking any blood thinners, aspirin, anti-inflammatory or vitamins.  There is a change in your physical condition such as a cold, fever, rash, cuts, sores, or any other infection, especially near your surgical site.    4. Do not drink alcohol the day before or day of your procedure.  Do not use any recreational marijuana at least 24 hours or street drugs (heroin, cocaine) at minimum 5 days prior to your procedure.     5. A Pre-Surgical History and Physical MUST be completed WITHIN 30 DAYS OR LESS prior to your procedure.by your Physician or an Urgent Care        THE DAY OF YOUR PROCEDURE:  1.  Follow instructions for ARRIVAL TIME as DIRECTED BY YOUR SURGEON.     2. Enter the MAIN entrance from [facility] and follow the signs to the free Parking Garage or  Parking (offered free of charge 7 am-5pm).      3. Enter the Main Entrance of the hospital (do not enter from the lower level of the parking garage). Upon entrance, check in with the  at the surgical information desk on your LEFT.   Bring your insurance card and photo ID to register      4. DO NOT EAT ANYTHING 8 hours prior to arrival for surgery.  You may have up to 8 ounces of water 4 hours prior to your arrival for surgery.   NOTE: ALL Gastric, Bariatric & Bowel surgery patients - you MUST follow your surgeon's instructions regarding 
    NEUROSURGERY PROGRESS NOTE    10/25/2024 9:47 PM                               Ivis A Brush                      LOS: 2 days   POD# 2 s/p Procedure(s) (LRB):  LUMBAR 2-LUMBAR 3 AND LUMBAR 3-LUMBAR 4 DECOMPRESSION FUSION FIXATION-TRANSFORAMINAL LUMBAR INTERBODY FUSION LUMBAR 2-LUMBAR 3 (N/A)    Subjective:  No acute events overnight. Patient c/o lumbar back pain and pain in both thighs.    Physical Exam:  Patient seen and examined    Vitals:    10/25/24 1930   BP: 136/75   Pulse: 82   Resp: 16   Temp: 98.6 °F (37 °C)   SpO2: 99%     GCS:  4 - Opens eyes on own  5 - Alert and oriented  6 - Follows simple motor commands  General: Well developed. Alert and cooperative in no acute distress.  HENT: atraumatic, neck supple  Eyes: Optic discs: Not tested  Pulmonary: unlabored respiratory effort  Cardiovascular:  Warm well perfused. No peripheral edema  Gastrointestinal: abdomen soft, NT, ND    Neurological:  Mental Status: Awake, alert, oriented x 4, speech clear and appropriate  Attention: Intact  Language: No aphasia or dysarthria noted  Sensation: Intact to all extremities to light touch  Coordination: Intact    Musculoskeletal:   Gait: Not tested   Assist devices: None   Tone: Normal  Motor strength:    Right  Left    Right  Left    Deltoid  4 4  Hip Flex  4 4   Biceps  4 4  Knee Extensors  4 4   Triceps  4 4  Knee Flexors  4 4   Wrist Ext  4 4  Ankle Dorsiflex.  4 4   Wrist Flex  4 4  Ankle Plantarflex.  4 4   Handgrip  4 4  Ext Lee Longus  4 4   Thumb Ext  4 4         Incision: CDI    Drain: 180 mL in past 24 hours      Radiological Findings:  XR LUMBAR SPINE (2-3 VIEWS)  Result Date: 10/24/2024  Postsurgical changes related to fusion in the lumbar spine.     CT LUMBAR SPINE WO CONTRAST  Result Date: 10/24/2024  Status post posterior fusion from L2 through L4 and dorsal decompression with metallic intervertebral cage spacer slightly indenting the anterior aspect of the thecal sac at the L3-4 level with grade 1 
    NEUROSURGERY PROGRESS NOTE    10/26/2024 11:34 AM                               Ivis A Brush                      LOS: 3 days   POD# 3 s/p Procedure(s) (LRB):  LUMBAR 2-LUMBAR 3 AND LUMBAR 3-LUMBAR 4 DECOMPRESSION FUSION FIXATION-TRANSFORAMINAL LUMBAR INTERBODY FUSION LUMBAR 2-LUMBAR 3 (N/A)    Subjective:  No acute events overnight. Patient c/o lumbar back pain and pain in both thighs.    Physical Exam:  Patient seen and examined    Vitals:    10/26/24 1117   BP:    Pulse:    Resp: 16   Temp:    SpO2:      GCS:  4 - Opens eyes on own  5 - Alert and oriented  6 - Follows simple motor commands  General: Well developed. Alert and cooperative in no acute distress.  HENT: atraumatic, neck supple  Eyes: Optic discs: Not tested  Pulmonary: unlabored respiratory effort  Cardiovascular:  Warm well perfused. No peripheral edema  Gastrointestinal: abdomen soft, NT, ND    Neurological:  Mental Status: Awake, alert, oriented x 4, speech clear and appropriate  Attention: Intact  Language: No aphasia or dysarthria noted  Sensation: Intact to all extremities to light touch  Coordination: Intact    Musculoskeletal:   Gait: Not tested   Assist devices: None   Tone: Normal  Motor strength:    Right  Left    Right  Left    Deltoid  4 4  Hip Flex  4 4   Biceps  4 4  Knee Extensors  4 4   Triceps  4 4  Knee Flexors  4 4   Wrist Ext  4 4  Ankle Dorsiflex.  4 4   Wrist Flex  4 4  Ankle Plantarflex.  4 4   Handgrip  4 4  Ext Lee Longus  4 4   Thumb Ext  4 4         Incision: CDI    Drain: 160 mL in past 24 hours      Radiological Findings:  XR LUMBAR SPINE (2-3 VIEWS)  Result Date: 10/24/2024  Postsurgical changes related to fusion in the lumbar spine.     CT LUMBAR SPINE WO CONTRAST  Result Date: 10/24/2024  Status post posterior fusion from L2 through L4 and dorsal decompression with metallic intervertebral cage spacer slightly indenting the anterior aspect of the thecal sac at the L3-4 level with grade 1 spondylolisthesis at L3-4. 
    V2.0  [facility] Progress note (Consult Service)      Name:  Ivis Ibarra /Age/Sex: 1970  (53 y.o. female)   MRN & CSN:  [id number] & [id number] Encounter Date/Time: 10/26/2024 3:51 PM EDT   Location:  [facility]/5512- PCP: Alma Rosa Lagos MD     Attending:Tramaine Wren MD  Consulting Provider: Johann Hanley MD      Hospital Day: 4    Assessment and Recommendations   Ivis Ibarra is a 53 y.o. female with pmh of CVID who presents with Lumbar radiculopathy    Hospital Problems             Last Modified POA    * (Principal) Lumbar radiculopathy 10/23/2024 Yes   #CVID    Recommendations:   NSGY primary  Analgesia prn  PT/OT  Vitals and labs reviewed and stable  Okay to continue home medications  Dispo per NSGY  Hypokalemia - replaced. Resume home potassium dose of 30 meq daily. Discharge on same. Discussed with patient.   Patient stable from internal medicine standpoint.      Diet ADULT DIET; Regular   DVT Prophylaxis [] Lovenox, []  Heparin, [] SCDs, [] Ambulation,  [] Eliquis, [] Xarelto   Code Status Full Code   Surrogate Decision Maker/ POA  Nico Bustos      Personally reviewed Lab Studies and Imaging       History From:    History obtained from the patient.     History of Present Illness:      Chief Complaint: Back Pain    Ivis Ibarra is a 53 y.o. female who presented for elective L2-4 decompression/fusion with NSGY. Hospitalist service consulted for aid in carl-op medical management.    No acute events reported overnight. Vitals stable. No new symptoms reported.      Review of Systems:      Pertinent positives and negatives discussed in HPI    Objective:     Intake/Output Summary (Last 24 hours) at 10/26/2024 1549  Last data filed at 10/26/2024 1215  Gross per 24 hour   Intake 360 ml   Output 80 ml   Net 280 ml      Vitals:   Vitals:    10/26/24 1117 10/26/24 1215 10/26/24 1334 10/26/24 1522   BP:  (!) 147/81     Pulse:  86     Resp: 16 16 16 16   Temp:  98.2 °F (36.8 °C)     TempSrc:  Oral     SpO2:  
    V2.0  [facility] Progress note (Consult Service)      Name:  Ivis Ibarra /Age/Sex: 1970  (53 y.o. female)   MRN & CSN:  [id number] & [id number] Encounter Date/Time: 10/25/2024 3:51 PM EDT   Location:  [facility]/5512-01 PCP: Alma Rosa Lagos MD     Attending:Tramaine Wren MD  Consulting Provider: Johann Hanley MD      Hospital Day: 3    Assessment and Recommendations   Ivis Ibarra is a 53 y.o. female with pmh of CVID who presents with Lumbar radiculopathy    Hospital Problems             Last Modified POA    * (Principal) Lumbar radiculopathy 10/23/2024 Yes   #CVID    Recommendations:   NSGY primary  Analgesia prn  PT/OT  Vitals and labs reviewed and stable  Okay to continue home medications  Dispo per NSGY  No new recommendation  Will follow loosely      Diet ADULT DIET; Regular   DVT Prophylaxis [] Lovenox, []  Heparin, [] SCDs, [] Ambulation,  [] Eliquis, [] Xarelto   Code Status Full Code   Surrogate Decision Maker/ POA  Nico Bustos      Personally reviewed Lab Studies and Imaging       History From:    History obtained from the patient.     History of Present Illness:      Chief Complaint: Back Pain    Ivis Ibarra is a 53 y.o. female who presented for elective L2-4 decompression/fusion with NSGY. Hospitalist service consulted for aid in carl-op medical management.    No acute events reported overnight. Vitals stable. No new symptoms reported.      Review of Systems:      Pertinent positives and negatives discussed in HPI    Objective:     Intake/Output Summary (Last 24 hours) at 10/25/2024 1904  Last data filed at 10/25/2024 1505  Gross per 24 hour   Intake 840 ml   Output 170 ml   Net 670 ml      Vitals:   Vitals:    10/25/24 1307 10/25/24 1337 10/25/24 1700 10/25/24 1745   BP:   (!) 142/81    Pulse:   84    Resp: 16 16 16 16   Temp:   98.1 °F (36.7 °C)    TempSrc:   Oral    SpO2:   99%    Weight:       Height:             Physical Exam:      General: NAD  Eyes: EOMI  ENT: neck supple  Cardiovascular: 
10/18 1515 PATIENT STATES DID NOT KNOW NEEDED CARDIO CLR.  SPOKE WITH KOLBY AT SURGEON OFFICE AND AWARE, STATES HE WILL CALL PATIENT AND LET US KNOW-MP  
10/18/24 @ 1544 Rakan from Dr Wren office called and said pt is getting cardiac cl paper work filled out today.  Cardiologist is faxing paper work to surgeon office and he will fax it to PAT   /NR    
4 Eyes Skin Assessment     NAME:  Ivis Ibarra  YOB: 1970  MEDICAL RECORD NUMBER:  [id number]    The patient is being assessed for  Admission    I agree that at least one RN has performed a thorough Head to Toe Skin Assessment on the patient. ALL assessment sites listed below have been assessed.      Areas assessed by both nurses:    Head, Face, Ears, Shoulders, Back, Chest, Arms, Elbows, Hands, Sacrum. Buttock, Coccyx, Ischium, Legs. Feet and Heels, and Under Medical Devices         Does the Patient have a Wound? Yes wound(s) were present on assessment. LDA wound assessment was Initiated and completed by RN    Incision to back  DTI to left shoulder from prone position during sx  Pt refused full skin assessment, only allowing RN to assess surgical site and visible areas. Charge RN at bedside       Orlin Prevention initiated by RN: No  Wound Care Orders initiated by RN: No    Pressure Injury (Stage 3,4, Unstageable, DTI, NWPT, and Complex wounds) if present, place Wound referral order by RN under : Yes    New Ostomies, if present place, Ostomy referral order under : No     Nurse 1 eSignature: Electronically signed by Altagracia Garner RN on 10/24/24 at 5:25 AM EDT    **SHARE this note so that the co-signing nurse can place an eSignature**    Nurse 2 eSignature: {Esignature:[id number]}    
Consulted for concern for DTI on left shoulder. Assessed left shoulder, appreciated a small faint purple area. Do not believe it is a pressure injury, believe it is natural coloring of pt's fair skin. Wound Care to sign off; re-consult for changes or deterioration.   
Drain removed as ordered. Tolerated well. Catheter and stitches intact, all stitches removed. Drain area and incision cleansed with soap and water. Dressing applied.   
Increased bed checks on patient due to not wanting an alarm or calling out. Patient appeared to be in good spirits this morning. Asked for tub of water and got herself cleaned up in the chair. Filled ice pitchers, assisted to the bathroom. Patient is back in chair. No further needs at this time. Pain is better per patient.   
Occupational Therapy  Facility/Department: [facility] 5T ORTHO/NEURO  Occupational Therapy Initial Assessment/Treatment    Name: Ivis Ibarra  : 1970  MRN: [id number]  Date of Service: 10/24/2024    Discharge Recommendations:  Home with assist PRN  OT Equipment Recommendations  Equipment Needed: No  Other: pt reports owning TTB       Patient Diagnosis(es): There were no encounter diagnoses.   Past Medical History:  has a past medical history of Anesthesia, Asthma, Atrial fibrillation (HCC), History of blood transfusion, Hyperlipidemia, Leg swelling, Legg-Perthes disease, Nausea & vomiting, and PONV (postoperative nausea and vomiting).  Past Surgical History:  has a past surgical history that includes joint replacement; Revision total hip arthroplasty (2008); hip surgery; Gastric bypass surgery; hernia repair; Abdomen surgery; Ovary removal; Revision total hip arthroplasty (2011); Shoulder arthroscopy (Left); Neck surgery; back surgery; and lumbar fusion (N/A, 10/23/2024).    Treatment Diagnosis: impaired ADLs/transfers s/p spinal surgery      Assessment  Performance deficits / Impairments: Decreased functional mobility ;Decreased ADL status;Decreased balance  Assessment: Pt is a 53 y.o. female who presents to [facility] for scheduled LUMBAR 2-LUMBAR 3 AND LUMBAR 3-LUMBAR 4 DECOMPRESSION FUSION FIXATION-TRANSFORAMINAL LUMBAR INTERBODY FUSION LUMBAR 2-LUMBAR 3 10/23. Pt from home, lives alone in 1 level duplex with 1 SERENA, independent with ADLs/IADLs and functional mobility at baseline. Pt seen for OT evaluation POD#1 and completes functional transfers with CGA-SBA using RW to/from bathroom. Pt reports prior hx of back surgeries, demo's good understanding of spinal precautions and use of AE (reacher) for LB dressing at baseline. Recommend home with PRN assist at discharge, pt reports friend/neighbor can provide. Will continue to follow in acute to ensure safety with ADLs for safe d/c home.  Treatment Diagnosis: 
Occupational Therapy  Occupational Therapy  Daily Treatment Note  Patient Name: Ivis Ibarra  MRN: [id number]    Chart Reviewed: Yes       Other position/activity restrictions: up as tolerated, LSO brace when OOB     Additional Pertinent Hx: s/p LUMBAR 2-LUMBAR 3 AND LUMBAR 3-LUMBAR 4 DECOMPRESSION FUSION FIXATION-TRANSFORAMINAL LUMBAR INTERBODY FUSION 10/23.        Diagnosis: Lumbar radiculopathy  Treatment Diagnosis: impaired ADLs/transfers s/p spinal surgery    Subjective: Pt eager to \"get up and walk. I'm tried of being in this bed.\" Pt agreeable to therapy session.    Pain: 5/10 back, already received pain meds    Social/Functional History  Lives With: Alone  Type of Home: Apartment (duplex with friend near by)  Home Layout: One level, Performs ADL's on one level  Home Access: Stairs to enter without rails  Entrance Stairs - Number of Steps: 1 SERENA  Bathroom Shower/Tub: Tub/Shower unit  Bathroom Toilet: Handicap height  Bathroom Equipment: Tub transfer bench  Bathroom Accessibility: Walker accessible  Home Equipment: Cane  Has the patient had two or more falls in the past year or any fall with injury in the past year?: No  ADL Assistance: Independent  Homemaking Assistance: Independent  Homemaking Responsibilities: Yes  Ambulation Assistance: Independent (no AD)  Transfer Assistance: Independent  Active : Yes  Occupation: On disability  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 
PACU Transfer Note    Vitals:    10/23/24 2000   BP:    Pulse: 98   Resp: 17   Temp: 98 °F (36.7 °C)   SpO2: 98%       In: -   Out: 480 [Urine:400; Drains:80]    Pain assessment:  present - adequately treated  Pain Level: 6    Report given to Receiving unit RN at bedside in pacu. Easily arousable to name. Following all commands. VSS. Back incision well approximated. Hemovac w/serousang drainage.   All belongings w/pt. Taken to her room per central transport.    10/23/2024 8:44 PM      
Patient is A&Ox 4. VSS on room air. Patient has endorsed pain to mid/lower back managed well per MAR and non-pharm measures. Patient is tolerating PO diet. Tolerating ambulation x 1 assist with GB/walker. Voiding via BRP. Patient updated on plan of care. Fall and safety precautions in place, call light within reach.   
Patient is alert and oriented x4. VSS on room air. Medications given per MAR, no side effects noted. Patient ambulating x1 with gait belt, walker, and brace. Complaints of pain to back, continuing to monitor and manage per MAR. Voiding well via BRP, no bm this shift. Drain in place and draining well. Patient in and out of chair all night,ambulating well.       Patient is currently resting in bed with bed alarm on for safety. Call light within reach and all fall precautions in place. Plan of care continues.  
Patient states people keep turning call light off every time she pushes it and so she does not want the bed alarm on. Patient was found walking up the hallway twice. Got herself up and sat in the chair. Taking herself to the bathroom.  Explained to patient it is for her safety to call out for assistance.  She refuses and does not want any alarms on bed or chair. Patient is a/o and does not want Male nurses or pca to help her to the restroom.   
Physical Therapy  Daily Treatment Note    Discharge Recommendation:  24 hour supervision/assist & Home PT  Equipment Needs:  Rolling walker    Assessment:  Pt with good tolerance for activity despite discomfort. Needing SBA for ambulation with rolling walker, CGA without walker. Pt from home alone. Would benefit from initial 24 hour supervision/assist, use of rolling walker for mobility and continued home PT. Pt will need a rolling walker issued prior to going home.     HOME HEALTH CARE: LEVEL 1 STANDARD  - Initial home health evaluation to occur within 24-48 hours, in patient home   - Therapy to evaluate with goal of regaining prior level of functioning   - Therapy to evaluate if patient has Home Health Aide needs for personal care    Chart Reviewed: Yes     Other position/activity restrictions: up as tolerated, LSO brace when OOB   Additional Pertinent Hx: s/p LUMBAR 2-LUMBAR 3 AND LUMBAR 3-LUMBAR 4 DECOMPRESSION FUSION FIXATION-TRANSFORAMINAL LUMBAR INTERBODY FUSION 10/23.      Diagnosis: lumbar radiculopathy   Treatment Diagnosis: impaired mobility    Subjective: Pt in chair initially. Agreeable to working with PT. Not sure when she's being D/Chris.     Pain: 6/10 low back. RN aware and addressed with medication during session.     Objective:    Bed mobility  Supine to sit: SBA, HOB flat without use of railing  Scooting: Supervision to EOB  Sit to Supine: SBA, HOB flat without use of railing    Transfers  Sit to stand: Supervision from chair; Supervision from commode with grab bar; Supervision onto bed  Stand to sit: Supervision into chair; Supervision onto commode with grab bar; Supervision onto bed  Bed <> chair: SBA without assistive device    Ambulation  Assistance Level: SBA  Assistive device: Rolling walker  Distance: ~200 ft in bailon  Quality of gait: Exaggerated weight shifting, due to R limp; no LOB; decreased pace  Other: Pt also ambulated 20 ft, 10 ft in room without walker CGA. Similar gait deviations. 
Physical Therapy  Facility/Department: [facility] 5T ORTHO/NEURO  Physical Therapy Initial Assessment/Treatment    Name: Ivis Ibarra  : 1970  MRN: [id number]  Date of Service: 10/24/2024    Discharge Recommendations:  24 hour supervision or assist, Home with Home health PT   PT Equipment Recommendations  Equipment Needed: Yes  Mobility Devices: Walker  Walker: Rolling      Patient Diagnosis(es): lumbar radiculopathy  Past Medical History:  has a past medical history of Anesthesia, Asthma, Atrial fibrillation (HCC), History of blood transfusion, Hyperlipidemia, Leg swelling, Legg-Perthes disease, Nausea & vomiting, and PONV (postoperative nausea and vomiting).  Past Surgical History:  has a past surgical history that includes joint replacement; Revision total hip arthroplasty (2008); hip surgery; Gastric bypass surgery; hernia repair; Abdomen surgery; Ovary removal; Revision total hip arthroplasty (2011); Shoulder arthroscopy (Left); Neck surgery; back surgery; and lumbar fusion (N/A, 10/23/2024).    Assessment  Assessment: 52 yo seen POD 1 following LUMBAR 2-LUMBAR 3 AND LUMBAR 3-LUMBAR 4 DECOMPRESSION FUSION FIXATION-TRANSFORAMINAL LUMBAR INTERBODY FUSION.  Pt not wanting to rely on walker for support and insisted on trying without walker but demo significant limp and did not appear safe.  Steadier gait when using walker.  Pt will need a walker for home.  Would recommend 24 hr A initially at dc along with home PT.  Pt does report she has no help lined up for home and feels she can manage just fine at home since she has been through numerous surgeries. Will follow.  Treatment Diagnosis: impaired mobility  Therapy Prognosis: Good  Decision Making: Medium Complexity  Requires PT Follow-Up: Yes  Activity Tolerance  Activity Tolerance: Patient tolerated evaluation without incident    Plan  Physical Therapy Plan  General Plan: 5-7 times per week  Current Treatment Recommendations: Functional mobility 
Pt alert and oriented x4. VSS on room air. Endorsing pain in lower back, managed with prn and scheduled medications. Ice packs applied when in bed. Tolerating diet, 1x episode of nausea but has resolved. Refused prn anti-nausea med. Abd x-ray done at bedside to rule out ileus. Voiding via BRP. No BM this shift, refused miralax this morning. Ambulating SBA with gait belt and walker. TLSO brace when out of bed. Hemovac drain removed as ordered, catheter and stitches intact. Dressing applied. Incision CDI, cleansed with soap and water. No acute neuro changes this shift. Pt currently in chair, denies further needs.     Fall precautions in place. Call light left within reach. Plan of care continues.  
Pt discharged to home. PIV removed, catheter intact and dressing applied. Tolerated well. All patient belongings given to patient. Discharge paperwork reviewed with patient. All questions answered. Pt discharged via wheelchair.  
Pt in chair with visitors at bedside. Pt alert and oriented x4. VSS on room air. Endorsing pain in lower back, managed with prn and scheduled medications. All medications given per MAR, no side effects noted. 1x assist with GB and walker, TLSO brace when Oob. 1x hemovac, draining bloody drainage. 40 mL output during this shift. Emptied and compressed. Tolerating diet, denies n/v at this time. 1x episode of nausea, prn zofran given and has resolved. Incision CDI. Voiding via BRP. No BM this shift. No acute neuro changes. Denies further needs at this time.    Fall precautions in place. Call light and belongings within reach. Plan of care continues.  
Pt is A/Ox4, VSS on room air, and x1 walker/gb and back brace to bathroom. Pt is voiding via bathroom privileges, tolerating food and fluids, and pain is being managed with scheduled and PRN pain meds. All safety precautions in place, call light within reach, plan of care continues.   
Pt is A/Ox4, VSS on room air, x1 walker/gb to the bathroom. Pt is voiding via bathroom privileges, tolerating food and fluids, and pain is being managed with scheduled and PRN meds per MAR. All safety precautions in place, call light within reach, plan of care continues.   
Upon transfer to room 5512 from PACU noted dsg to Hemovac and bedding to be saturated, also noted with large amount of edema around site. 100 ml of bloody drainage emptied from drain. Notified neuro NP of change compared to when site visualized with PACU RN less than 1 hour prior. Instructed to keep pressure for 10-15 mins to site and will she will round on patient.     Vitals stable other than elevated BP, pt in notable pain at the time. Stable on room air. PAC dsg CD&I. F/c CD&I. No further complication at this time.   
Treatment Minutes:  38 minutes    Total Treatment Minutes:  38 minutes    Yeimi Angel, PT

## 2024-10-27 NOTE — CARE COORDINATION
Patient's chart reviewed. No new recommendation. Patient is stable from internal medicine standpoint to be discharged per neurosurgery. Resume home medications at discharge. Patient reports she takes three 10 meq potassium tablets at home and torsemide 20 mg daily. This may be continued. Ideally, would recommend BMP within 4-5 days of discharge and follow up with PCP with results within the week.  Internal medicine team will sign off and won't be seeing patient today. Please re-consult if needs arise.    Electronically signed by Odette Sosa MD on 10/27/24 at 9:08 AM EDT

## 2024-10-27 NOTE — DISCHARGE INSTRUCTIONS
Do not take any blood thinners (ie Aspirin, Coumadin, Plavix, etc), NSAID's (Advil, Aleve, Mobic, etc), or vitamin/herbal supplements prior to your follow up appointment. You can ask the doctor at your follow up appointment when it is OK to start taking these medications, if applicable.    Incisional Care: Open to air. If Dermabond wet from drainage or water, then pat dry with gauze or clean dry towel.

## 2024-10-27 NOTE — PLAN OF CARE
Problem: Pain  Goal: Verbalizes/displays adequate comfort level or baseline comfort level  10/27/2024 1121 by Digna Staley, RN  Outcome: Progressing  Flowsheets  Taken 10/27/2024 1121 by Digna Staley RN  Verbalizes/displays adequate comfort level or baseline comfort level:   Encourage patient to monitor pain and request assistance   Administer analgesics based on type and severity of pain and evaluate response   Assess pain using appropriate pain scale   Implement non-pharmacological measures as appropriate and evaluate response  Taken 10/27/2024 0334 by Amee Lee RN  Verbalizes/displays adequate comfort level or baseline comfort level: Assess pain using appropriate pain scale  Note: Endorsing pain in lower back, managed with prn and scheduled medications. Educated on medication schedule and to notify RN of worsening pain. Verbalizes understanding. Call light left within reach. Plan of care continues.     Problem: Safety - Adult  Goal: Free from fall injury  10/27/2024 1121 by Digna Staley, RN  Outcome: Progressing  Note: Fall precautions in place. Ambulating with GB and walker. TLSO brace when OOB. Floor free from clutter. Call light and belongings within reach. Plan of care continues.

## 2024-10-27 NOTE — PLAN OF CARE
Problem: Discharge Planning  Goal: Discharge to home or other facility with appropriate resources  Outcome: Progressing     Problem: Pain  Goal: Verbalizes/displays adequate comfort level or baseline comfort level  10/26/2024 2219 by Amee Lee, RN  Outcome: Progressing  Flowsheets (Taken 10/26/2024 1945)  Verbalizes/displays adequate comfort level or baseline comfort level: Assess pain using appropriate pain scale     Problem: Safety - Adult  Goal: Free from fall injury  10/26/2024 2219 by Amee Lee, RN  Outcome: Progressing     Problem: ABCDS Injury Assessment  Goal: Absence of physical injury  Outcome: Progressing

## 2024-10-27 NOTE — CARE COORDINATION
Case Management Assessment            Discharge Note                    Date / Time of Note: 10/27/2024 10:35 AM                  Discharge Note Completed by: Delmi Zhou RN    Patient Name: Ivis Ibarra   YOB: 1970  Diagnosis: Mechanical breakdown of internal fixation device of bone of extremity, initial encounter ([facility]) [T84.119A]  Lumbar stenosis with neurogenic claudication [M48.062]  Lumbar radiculopathy [M54.16]   Date / Time: 10/23/2024 11:16 AM    Current PCP: Alma Rosa Lagos MD  Clinic patient: No    Hospitalization in the last 30 days: No       Advance Directives:  Code Status: Full Code  [place] DNR form completed and on chart: No    Financial:  Payor: [facility] MEDICARE / Plan: [employer] DUAL ADVANTAGE / Product Type: *No Product type* /      Pharmacy:    [id number] [address] [facility] [phone] -  [phone]  [address] [place] [id number]  Phone: [phone] Fax: [phone]      Assistance purchasing medications?: Potential Assistance Purchasing Medications: No  Assistance provided by Case Management: None at this time    Does patient want to participate in local refill/ meds to beds program?:      Meds To Beds General Rules:  1. Can ONLY be done Monday- Friday between 8:30am-5pm  2. Prescription(s) must be in pharmacy by 3pm to be filled same day  3.Copy of patient's insurance/ prescription drug card and patient face sheet must be sent along with the prescription(s)  4. Cost of Rx cannot be added to hospital bill. If financial assistance is needed, please contact unit  or ;  or  CANNOT provide pharmacy voucher for patients co-pays  5. Patients can then  the prescription on their way out of the hospital at discharge, or pharmacy can deliver to the bedside if staff is available. (payment due at time of pick-up or delivery - cash, check, or card accepted)     Able to afford home

## 2024-10-27 NOTE — DISCHARGE SUMMARY
Discharge Summary    Date of Admission: 10/23/2024 11:16 AM  Date of Discharge:   Admission Diagnosis: Lumbar radiculopathy  Discharge Diagnosis: Same   Condition on Discharge: {Desc; excellent/good/fair:18631}  Attending for Admission: Tramaine Wren MD  Procedures: Procedure(s) (LRB):  LUMBAR 2-LUMBAR 3 AND LUMBAR 3-LUMBAR 4 DECOMPRESSION FUSION FIXATION-TRANSFORAMINAL LUMBAR INTERBODY FUSION LUMBAR 2-LUMBAR 3 (N/A)  Consults: IP CONSULT TO HOSPITALIST    Reason for Admission:  Ivis Ibarra is a 53 y.o. female patient who was admitted to the hospital for complaints of ***. {He/she (caps):86344} underwent the procedure listed above on ***.     Hospital Course:  After surgery, {Desc; his/her:72682} pre-operative *** pain was {PRESENT/ABSENT:19725}. {He/she (caps):42386} complained of ***. The pain was well-controlled on oral medications.  {Desc; his/her:73110} brace was in place. The incision was clean, dry and intact. There was no erythema or edema around the surgical site. Prior to discharge {He/she (caps):68880} was eating well, urinating and ambulating with a steady gait.    Discharge Vitals/Labs:  /75   Pulse 85   Temp 97.8 °F (36.6 °C) (Oral)   Resp 16   Ht 1.549 m (5' 1\")   Wt 76.6 kg (168 lb 12.8 oz)   LMP 03/12/2011   SpO2 100%   BMI 31.89 kg/m²   {LABS:683756013}    Discharge Medications:  The patient suffers from a major neurological surgery that pain cannot be managed within an average of 30 MED per day. Severe acute postoperative pain is the reason for exceeding the 30 MED average, and the prescription reflects the same dosage patient received while inpatient, which is the lowest dose consistent with the patient’s medical condition. Non-opioid treatment options have been considered prior to prescribing opioids, and the patient has been advised of the benefits and risks of the opioid (including the potential for addiction).     Medication List        START taking these medications

## (undated) DEVICE — DRAIN SURG 15FR SIL RND CHN W/ TRCR FULL FLUT DBL WRP TRAD

## (undated) DEVICE — GLOVE SURG SZ 8 L11.77IN FNGR THK11.8MIL STRW LTX POLYMER

## (undated) DEVICE — RESERVOIR,SUCTION,100CC,SILICONE: Brand: MEDLINE

## (undated) DEVICE — SPONGE,NEURO,0.5"X3",XR,STRL,LF,10/PK: Brand: MEDLINE

## (undated) DEVICE — SOLUTION IV 1000ML 0.9% SOD CHL

## (undated) DEVICE — GLOVE SURG SZ 65 THK91MIL LTX FREE SYN POLYISOPRENE

## (undated) DEVICE — SUTURE STRATAFIX SYMMETRIC PDS + SZ 1 L18IN ABSRB VLT L48MM SXPP1A400

## (undated) DEVICE — GARMENT,MEDLINE,DVT,INT,CALF,MED, GEN2: Brand: MEDLINE

## (undated) DEVICE — SUTURE MONOCRYL + SZ 4-0 L27IN ABSRB UD L19MM PS-2 3/8 CIR MCP426H

## (undated) DEVICE — BIPOLAR SEALER 23-112-1 AQM 6.0: Brand: AQUAMANTYS ®

## (undated) DEVICE — GLOVE SURG SZ 85 L12IN FNGR THK79MIL GRN LTX FREE

## (undated) DEVICE — SUTURE VICRYL + SZ 0 L18IN ABSRB UD L36MM CT-1 1/2 CIR VCP840D

## (undated) DEVICE — TOOL MR8-14MH30 MR8 14CM MATCH 3MM: Brand: MIDAS REX MR8

## (undated) DEVICE — TOWEL,STOP FLAG GOLD N-W: Brand: MEDLINE

## (undated) DEVICE — DRAPE MICSCP W54XL150IN W/ 4 BINOC GLS LENS LEICA

## (undated) DEVICE — TRAP FLUID

## (undated) DEVICE — GLOVE SURG SZ 65 L12IN FNGR THK79MIL GRN LTX FREE

## (undated) DEVICE — SOLUTION SURG PREP 26 CC PURPREP

## (undated) DEVICE — SUTURE VICRYL SZ 2-0 L18IN ABSRB UD CT-1 L36MM 1/2 CIR J839D

## (undated) DEVICE — GLOVE SURG SZ 85 L12IN FNGR ORTHO 126MIL CRM LTX FREE

## (undated) DEVICE — NEPTUNE E-SEP SMOKE EVACUATION PENCIL, COATED, 70MM BLADE, PUSH BUTTON SWITCH: Brand: NEPTUNE E-SEP

## (undated) DEVICE — PACK,UNIVERSAL,NO GOWNS: Brand: MEDLINE

## (undated) DEVICE — GLOVE SURG SZ 7 L12IN FNGR THK79MIL GRN LTX FREE

## (undated) DEVICE — MARKER SURG PASS SPHR NDI

## (undated) DEVICE — SUTURE STRATAFIX SYMMETRIC SZ 1 L18IN ABSRB VLT CT1 L36CM SXPP1A404

## (undated) DEVICE — ORTHO PRE OP PACK: Brand: MEDLINE INDUSTRIES, INC.

## (undated) DEVICE — LAMINECTOMY: Brand: MEDLINE INDUSTRIES, INC.

## (undated) DEVICE — AGENT HEMOSTATIC SURGIFLOW MATRIX KIT W/THROMBIN

## (undated) DEVICE — BLANKET WRM W29.9XL79.1IN UP BODY FORC AIR MISTRAL-AIR